# Patient Record
Sex: FEMALE | Race: WHITE | NOT HISPANIC OR LATINO | Employment: FULL TIME | URBAN - METROPOLITAN AREA
[De-identification: names, ages, dates, MRNs, and addresses within clinical notes are randomized per-mention and may not be internally consistent; named-entity substitution may affect disease eponyms.]

---

## 2018-05-21 ENCOUNTER — OFFICE VISIT (OUTPATIENT)
Dept: URGENT CARE | Age: 63
End: 2018-05-21
Payer: COMMERCIAL

## 2018-05-21 VITALS
BODY MASS INDEX: 26.2 KG/M2 | DIASTOLIC BLOOD PRESSURE: 77 MMHG | SYSTOLIC BLOOD PRESSURE: 166 MMHG | WEIGHT: 153.44 LBS | TEMPERATURE: 98.4 F | RESPIRATION RATE: 16 BRPM | HEART RATE: 83 BPM | HEIGHT: 64 IN | OXYGEN SATURATION: 99 %

## 2018-05-21 DIAGNOSIS — H57.12 EYE PAIN, LEFT: Primary | ICD-10-CM

## 2018-05-21 PROCEDURE — 99203 OFFICE O/P NEW LOW 30 MIN: CPT | Performed by: PHYSICIAN ASSISTANT

## 2018-05-21 RX ORDER — POLYMYXIN B SULFATE AND TRIMETHOPRIM 1; 10000 MG/ML; [USP'U]/ML
1 SOLUTION OPHTHALMIC EVERY 4 HOURS
Qty: 10 ML | Refills: 0 | Status: SHIPPED | OUTPATIENT
Start: 2018-05-21 | End: 2018-06-28 | Stop reason: ALTCHOICE

## 2018-05-21 RX ORDER — VALSARTAN 160 MG/1
TABLET ORAL
COMMUNITY
End: 2018-06-28

## 2018-05-21 RX ORDER — PROPARACAINE HYDROCHLORIDE 5 MG/ML
1 SOLUTION/ DROPS OPHTHALMIC ONCE
Status: COMPLETED | OUTPATIENT
Start: 2018-05-21 | End: 2018-05-21

## 2018-05-21 RX ORDER — TRAMADOL HYDROCHLORIDE 50 MG/1
1 TABLET ORAL
COMMUNITY
Start: 2015-08-30 | End: 2018-08-16 | Stop reason: SDUPTHER

## 2018-05-21 RX ADMIN — PROPARACAINE HYDROCHLORIDE 1 DROP: 5 SOLUTION/ DROPS OPHTHALMIC at 09:01

## 2018-05-21 NOTE — LETTER
May 21, 2018     Patient: Remington Garcia   YOB: 1955   Date of Visit: 5/21/2018       To Whom it May Concern:    Faraz Gallardo was seen in my clinic on 5/21/2018  She may return to work tomorrow  If you have any questions or concerns, please don't hesitate to call  Sincerely,          Hansa Donald PA-C        CC: No Recipients

## 2018-05-21 NOTE — PATIENT INSTRUCTIONS
Motrin and/or Tylenol for pain  Follow up with PCP in 3-5 days  Arthur Grater not wear contacts for the next week  Proceed to  ER if symptoms worsen  Eye Pain   WHAT YOU NEED TO KNOW:   Eye pain may be caused by a problem within your eye  A problem or condition in another body area can also cause pain that travels to your eye  Some causes of eye pain include dry eyes, inflammation, a sinus infection, or a cluster headache  DISCHARGE INSTRUCTIONS:   Medicines: You may need any of the following:  · Artificial tears  are eyedrops that can help moisturize your eyes and relieve your pain  Ask your healthcare provider how often to use artificial tears  · NSAIDs , such as ibuprofen, help decrease swelling, pain, and fever  This medicine is available with or without a doctor's order  NSAIDs can cause stomach bleeding or kidney problems in certain people  If you take blood thinner medicine, always ask if NSAIDs are safe for you  Always read the medicine label and follow directions  Do not give these medicines to children under 10months of age without direction from your child's healthcare provider  · Take your medicine as directed  Contact your healthcare provider if you think your medicine is not helping or if you have side effects  Tell him of her if you are allergic to any medicine  Keep a list of the medicines, vitamins, and herbs you take  Include the amounts, and when and why you take them  Bring the list or the pill bottles to follow-up visits  Carry your medicine list with you in case of an emergency  Follow up with your healthcare provider as directed: You may be referred to an eye specialist  Write down your questions so you remember to ask them during your visits  Contact your healthcare provider if:   · You have a fever  · Your eye pain gets worse when you move your eyes  · You have questions or concerns about your condition or care    Return to the emergency department if:   · You have any vision loss  · You have sudden vision changes such as blurred vision, double vision, or seeing halos around lights  · You develop severe eye pain  © 2017 [employer] Information is for End User's use only and may not be sold, redistributed or otherwise used for commercial purposes  All illustrations and images included in CareNotes® are the copyrighted property of [employer]  or [employer]  The above information is an  only  It is not intended as medical advice for individual conditions or treatments  Talk to your doctor, nurse or pharmacist before following any medical regimen to see if it is safe and effective for you  Eye Foreign Body   AMBULATORY CARE:   An eye foreign body (EFB)  is an object that gets stuck in your eye  Tiny pieces of metal, dust, wood, and sand are the most common foreign bodies  Signs and symptoms:   · The feeling that something is in your eye    · Eye pain, redness, or watering    · Sensitivity to light    · Blurry vision or changes in your vision  Seek care immediately if:   · You suddenly lose your vision  · You have severe eye pain  Contact your healthcare provider or ophthalmologist if:   · You have new or worse eye swelling  · Your symptoms do not get better, even after the foreign body is removed  · You have white or yellow fluid draining from your eye  · You have questions or concerns about your condition or care  Treatment for an EFB  will include medicine to decrease pain and prevent an infection  Your healthcare provider may numb your eye and flush it with liquid to help remove the FB  The provider may also use a cotton swab or other tools to help remove the FB  If the FB is hard to remove or has damaged deeper parts of your eye, you will need surgery to remove it  Help your eye heal:  You may have pain, sensitivity to light, or blurry vision for a few days   Do the following to help your eye heal:  · Do not rub your eye  This may cause more damage or infection  · Do not wear your contacts lenses until your eye heals  Ask your healthcare provider how long to follow this direction  · Wear sunglasses as directed  Sunglasses help protect the eye and decrease sensitivity to light  Prevent another EFB:   · Wear safety glasses, eye shields, or goggles  These items can prevent eye injury  Make sure the eyewear wraps around the sides of your face  Wear these items while you work with chemicals, metal, wood, or bodily fluids such as blood  Also wear protective eyewear during sports such as racquetball or swimming  Do not use regular eye glasses for eye protection  They will not protect your eyes from foreign bodies or chemicals  · Use contact lenses as directed  Wash your hands before you clean, insert, or remove your contacts  Insert and remove contact lenses correctly  Clean and change your contacts as directed to help prevent eye damage or infection  Follow up with your healthcare provider or ophthalmologist in 1 to 2 days:  Write down your questions so you remember to ask them during your visits  © 2017 [employer] [facility] Information is for End User's use only and may not be sold, redistributed or otherwise used for commercial purposes  All illustrations and images included in CareNotes® are the copyrighted property of [employer]  or [employer]  The above information is an  only  It is not intended as medical advice for individual conditions or treatments  Talk to your doctor, nurse or pharmacist before following any medical regimen to see if it is safe and effective for you

## 2018-05-21 NOTE — PROGRESS NOTES
[facility]        NAME: Katie Taylor is a 60 y o  female  : 1955    MRN: [id number]  DATE: May 21, 2018  TIME: 9:40 AM    Assessment and Plan   Eye pain, left [H57 12]  1  Eye pain, left  proparacaine (ALCAINE) 0 5 % ophthalmic solution 1 drop    fluorescein sodium sterile 1 mg ophthalmic strip 1 strip    polymyxin b-trimethoprim (POLYTRIM) ophthalmic solution         Patient Instructions     Motrin and/or Tylenol for pain  Follow up with PCP in 3-5 days  Alejandro Philip not wear contacts for the next week  Proceed to  ER if symptoms worsen  Chief Complaint     Chief Complaint   Patient presents with    Foreign Body in Eye     Pt states she got something in her left eye this morning while in the shower  History of Present Illness       Left Eye foreign body complaint that started this morning while in the shower  Wears contacts  No discharge  No blurry vision or double visiion  Eye Pain    The left eye is affected  This is a new problem  The current episode started today  The problem has been unchanged  There was no injury mechanism  The pain is mild  There is no known exposure to pink eye  She wears contacts (hard )  Pertinent negatives include no blurred vision, eye discharge, double vision, eye redness, fever, foreign body sensation, itching, nausea, photophobia, recent URI or vomiting  She has tried water for the symptoms  The treatment provided mild relief  Review of Systems   Review of Systems   Constitutional: Negative  Negative for fever  HENT: Negative  Eyes: Positive for pain  Negative for blurred vision, double vision, photophobia, discharge, redness, itching and visual disturbance  Respiratory: Negative  Cardiovascular: Negative  Gastrointestinal: Negative for nausea and vomiting  Musculoskeletal: Negative  Skin: Negative            Current Medications       Current Outpatient Prescriptions:     traMADol (ULTRAM) 50 mg tablet, Take 1 tablet by mouth, Disp: , Rfl:     valsartan (DIOVAN) 160 mg tablet, Take by mouth, Disp: , Rfl:     polymyxin b-trimethoprim (POLYTRIM) ophthalmic solution, Administer 1 drop into the left eye every 4 (four) hours, Disp: 10 mL, Rfl: 0  No current facility-administered medications for this visit  Current Allergies     Allergies as of 05/21/2018    (No Known Allergies)            The following portions of the patient's history were reviewed and updated as appropriate: allergies, current medications, past family history, past medical history, past social history, past surgical history and problem list      Past Medical History:   Diagnosis Date    Hypertension        Past Surgical History:   Procedure Laterality Date    TONSILLECTOMY      WISDOM TOOTH EXTRACTION         History reviewed  No pertinent family history  Medications have been verified  Objective   /77   Pulse 83   Temp 98 4 °F (36 9 °C) (Tympanic)   Resp 16   Ht 5' 4" (1 626 m)   Wt 69 6 kg (153 lb 7 oz)   SpO2 99%   BMI 26 34 kg/m²          Physical Exam     Physical Exam   Constitutional: She is oriented to person, place, and time  She appears well-developed and well-nourished  No distress  HENT:   Head: Normocephalic and atraumatic  Right Ear: External ear normal    Left Ear: External ear normal    Nose: Nose normal    Mouth/Throat: Oropharynx is clear and moist  No oropharyngeal exudate  Eyes: Conjunctivae, EOM and lids are normal  Pupils are equal, round, and reactive to light  Lids are everted and swept, no foreign bodies found  Right eye exhibits no chemosis, no discharge, no exudate and no hordeolum  No foreign body present in the right eye  Left eye exhibits no chemosis, no discharge, no exudate and no hordeolum  No foreign body present in the left eye  Right conjunctiva is not injected  Right conjunctiva has no hemorrhage  Left conjunctiva is not injected  Left conjunctiva has no hemorrhage  No scleral icterus     2 drops of proparacaine instilled to L eye and fluorescein applied  No foreign bodies or abrasions noted to the cornea  Cornea appears to irritated from rubbing  Neck: Normal range of motion  Neck supple  Cardiovascular: Normal rate, regular rhythm, normal heart sounds and intact distal pulses  No murmur heard  Pulmonary/Chest: Effort normal and breath sounds normal  No respiratory distress  She has no rales  Abdominal: Soft  Bowel sounds are normal  There is no tenderness  Musculoskeletal: Normal range of motion  Lymphadenopathy:     She has no cervical adenopathy  Neurological: She is alert and oriented to person, place, and time  Skin: Skin is warm and dry  Psychiatric: She has a normal mood and affect  Nursing note and vitals reviewed        Procedures

## 2018-06-26 ENCOUNTER — APPOINTMENT (OUTPATIENT)
Dept: LAB | Facility: IMAGING CENTER | Age: 63
End: 2018-06-26
Payer: COMMERCIAL

## 2018-06-26 ENCOUNTER — TRANSCRIBE ORDERS (OUTPATIENT)
Dept: ADMINISTRATIVE | Facility: HOSPITAL | Age: 63
End: 2018-06-26

## 2018-06-26 DIAGNOSIS — E78.49 OTHER HYPERLIPIDEMIA: ICD-10-CM

## 2018-06-26 DIAGNOSIS — E78.49 OTHER HYPERLIPIDEMIA: Primary | ICD-10-CM

## 2018-06-26 DIAGNOSIS — I10 ESSENTIAL (PRIMARY) HYPERTENSION: ICD-10-CM

## 2018-06-26 LAB
ALBUMIN SERPL BCP-MCNC: 3.8 G/DL (ref 3.5–5)
ALP SERPL-CCNC: 82 U/L (ref 46–116)
ALT SERPL W P-5'-P-CCNC: 25 U/L (ref 12–78)
ANION GAP SERPL CALCULATED.3IONS-SCNC: 6 MMOL/L (ref 4–13)
AST SERPL W P-5'-P-CCNC: 27 U/L (ref 5–45)
BILIRUB SERPL-MCNC: 0.48 MG/DL (ref 0.2–1)
BUN SERPL-MCNC: 23 MG/DL (ref 5–25)
CALCIUM SERPL-MCNC: 9.3 MG/DL (ref 8.3–10.1)
CHLORIDE SERPL-SCNC: 105 MMOL/L (ref 100–108)
CHOLEST SERPL-MCNC: 226 MG/DL (ref 50–200)
CO2 SERPL-SCNC: 31 MMOL/L (ref 21–32)
CREAT SERPL-MCNC: 0.69 MG/DL (ref 0.6–1.3)
GFR SERPL CREATININE-BSD FRML MDRD: 93 ML/MIN/1.73SQ M
GLUCOSE P FAST SERPL-MCNC: 89 MG/DL (ref 65–99)
HDLC SERPL-MCNC: 83 MG/DL (ref 40–60)
LDLC SERPL CALC-MCNC: 130 MG/DL (ref 0–100)
POTASSIUM SERPL-SCNC: 4.7 MMOL/L (ref 3.5–5.3)
PROT SERPL-MCNC: 6.6 G/DL (ref 6.4–8.2)
SODIUM SERPL-SCNC: 142 MMOL/L (ref 136–145)
TRIGL SERPL-MCNC: 67 MG/DL

## 2018-06-26 PROCEDURE — 80053 COMPREHEN METABOLIC PANEL: CPT

## 2018-06-26 PROCEDURE — 80061 LIPID PANEL: CPT

## 2018-06-26 PROCEDURE — 36415 COLL VENOUS BLD VENIPUNCTURE: CPT

## 2018-06-27 RX ORDER — ROSUVASTATIN CALCIUM 5 MG/1
TABLET, COATED ORAL EVERY 24 HOURS
COMMUNITY
Start: 2018-03-29 | End: 2020-04-13 | Stop reason: ALTCHOICE

## 2018-06-28 ENCOUNTER — OFFICE VISIT (OUTPATIENT)
Dept: FAMILY MEDICINE CLINIC | Facility: CLINIC | Age: 63
End: 2018-06-28
Payer: COMMERCIAL

## 2018-06-28 VITALS
OXYGEN SATURATION: 97 % | HEIGHT: 64 IN | DIASTOLIC BLOOD PRESSURE: 76 MMHG | HEART RATE: 89 BPM | TEMPERATURE: 97.9 F | SYSTOLIC BLOOD PRESSURE: 156 MMHG | RESPIRATION RATE: 16 BRPM

## 2018-06-28 DIAGNOSIS — G89.29 CHRONIC LOW BACK PAIN WITHOUT SCIATICA, UNSPECIFIED BACK PAIN LATERALITY: ICD-10-CM

## 2018-06-28 DIAGNOSIS — F41.9 ANXIETY: ICD-10-CM

## 2018-06-28 DIAGNOSIS — M19.042 PRIMARY OSTEOARTHRITIS OF BOTH HANDS: ICD-10-CM

## 2018-06-28 DIAGNOSIS — E55.9 VITAMIN D INSUFFICIENCY: ICD-10-CM

## 2018-06-28 DIAGNOSIS — E78.2 MIXED HYPERLIPIDEMIA: ICD-10-CM

## 2018-06-28 DIAGNOSIS — I10 ESSENTIAL HYPERTENSION: Primary | ICD-10-CM

## 2018-06-28 DIAGNOSIS — M54.50 CHRONIC LOW BACK PAIN WITHOUT SCIATICA, UNSPECIFIED BACK PAIN LATERALITY: ICD-10-CM

## 2018-06-28 DIAGNOSIS — M19.041 PRIMARY OSTEOARTHRITIS OF BOTH HANDS: ICD-10-CM

## 2018-06-28 PROCEDURE — 99214 OFFICE O/P EST MOD 30 MIN: CPT | Performed by: FAMILY MEDICINE

## 2018-06-28 RX ORDER — VALSARTAN AND HYDROCHLOROTHIAZIDE 160; 12.5 MG/1; MG/1
1 TABLET, FILM COATED ORAL DAILY
Qty: 30 TABLET | Refills: 3 | Status: SHIPPED | OUTPATIENT
Start: 2018-06-28 | End: 2018-08-16

## 2018-06-28 NOTE — PROGRESS NOTES
Assessment/Plan:    No problem-specific Assessment & Plan notes found for this encounter  The patient was told to hold off her Crestor for 1 more week  I am going to adjust her Diovan and add hydrochlorothiazide  She is going to continue to monitor her blood pressure at home  She is going to take Tylenol for her mild to moderate pain  Tramadol for severe pain  She is also going to try to take Aleve for her arthritis pain  Diagnoses and all orders for this visit:    Essential hypertension  -     valsartan-hydrochlorothiazide (DIOVAN-HCT) 160-12 5 MG per tablet; Take 1 tablet by mouth daily  -     CBC and differential; Future  -     Comprehensive metabolic panel; Future  -     TSH, 3rd generation with T4 reflex; Future    Mixed hyperlipidemia  -     Lipid Panel with Direct LDL reflex; Future    Anxiety    Chronic low back pain without sciatica, unspecified back pain laterality    Primary osteoarthritis of both hands    Vitamin D insufficiency  -     Vitamin D 25 hydroxy; Future    Other orders  -     rosuvastatin (CRESTOR) 5 mg tablet; every 24 hours          Subjective:      Patient ID: Maru Kenny is a 60 y o  female  Hyperlipidemia   Pertinent negatives include no chest pain or shortness of breath  Hypertension   Associated symptoms include headaches  Pertinent negatives include no chest pain, palpitations or shortness of breath  Patient is here today for follow-up of above medical problems  She has been experiencing headache  She held on her Crestor  She continued to have some headache  She has been monitoring her blood pressure at home it has been elevated  She also complained of pain and ache in her hands worse in her right hand  She has history of arthritis  The following portions of the patient's history were reviewed and updated as appropriate:   She  has a past medical history of Anxiety (2/24/2012); Asthma; Hyperlipidemia;  Hypertension; Osteoarthritis (6/5/2007); and Skin cancer (07/16/2015)  She   Patient Active Problem List    Diagnosis Date Noted    Vitamin D insufficiency 06/28/2018    Urinary incontinence 08/20/2015    Anxiety 02/24/2012    Hyperlipidemia 01/19/2011    Herpes simplex 06/21/2008    Osteoarthritis 06/05/2007    Spinal stenosis 12/22/2006    Herniation of intervertebral disc 10/07/2002    Back pain 10/07/2002    Hypertension 04/04/2002    Allergic rhinitis 06/19/2000     She  has a past surgical history that includes [place] tooth extraction and Tonsillectomy  Her family history includes Hypertension in her brother, father, mother, and sister  Current Outpatient Prescriptions on File Prior to Visit   Medication Sig    traMADol (ULTRAM) 50 mg tablet Take 1 tablet by mouth    [DISCONTINUED] valsartan (DIOVAN) 160 mg tablet Take by mouth    [DISCONTINUED] polymyxin b-trimethoprim (POLYTRIM) ophthalmic solution Administer 1 drop into the left eye every 4 (four) hours     No current facility-administered medications on file prior to visit  She has No Known Allergies       Review of Systems   Constitutional: Negative for chills and fever  HENT: Negative for trouble swallowing  Eyes: Negative for visual disturbance  Respiratory: Negative for cough and shortness of breath  Cardiovascular: Negative for chest pain, palpitations and leg swelling  Gastrointestinal: Negative for abdominal pain, constipation and diarrhea  Endocrine: Negative for cold intolerance and heat intolerance  Genitourinary: Negative for difficulty urinating and dysuria  Musculoskeletal: Positive for arthralgias  Negative for gait problem  Skin: Negative for rash  Neurological: Positive for headaches  Negative for dizziness, tremors and seizures  Hematological: Negative for adenopathy  Psychiatric/Behavioral: Negative for behavioral problems           Objective:      /76 (BP Location: Left arm, Patient Position: Sitting, Cuff Size: Adult)   Pulse 89 Temp 97 9 °F (36 6 °C) (Tympanic)   Resp 16   Ht 5' 4" (1 626 m)   LMP  (Exact Date)   SpO2 97%          Physical Exam   Constitutional: She is oriented to person, place, and time  She appears well-developed and well-nourished  HENT:   Head: Normocephalic and atraumatic  Eyes: EOM are normal  Pupils are equal, round, and reactive to light  Neck: Normal range of motion  Neck supple  Cardiovascular: Normal rate, regular rhythm and normal heart sounds  Pulmonary/Chest: Effort normal and breath sounds normal    Abdominal: Soft  Bowel sounds are normal    Musculoskeletal: Normal range of motion  She exhibits no edema  Lymphadenopathy:     She has no cervical adenopathy  Neurological: She is alert and oriented to person, place, and time  No cranial nerve deficit  Skin: Skin is warm  Psychiatric: She has a normal mood and affect

## 2018-07-17 ENCOUNTER — TELEPHONE (OUTPATIENT)
Dept: FAMILY MEDICINE CLINIC | Facility: CLINIC | Age: 63
End: 2018-07-17

## 2018-07-17 NOTE — TELEPHONE ENCOUNTER
Pc from pt states she got a bill from [facility] 6/28/18 states she was charged for Behavior chain smoking & she does not smoke & Impacted ear wax pt states she was here on that date to review her labs,she did not have wax removed from her ears  Also she was charged for a Preventative visit   Pts #[phone]

## 2018-07-18 NOTE — TELEPHONE ENCOUNTER
I spoke with IT, doctors office note does not state she is a smoker to list ear was removal  They states it was pulled in from another source  They sent it to analysis dept to call me back  Gaye Garcia called me back , states states pt was only charged for [id number] (25 min ov) she does have balance of $21 36   She checked both physician and hospital billing, she did not see those charged  She gave me a phone number for pt to call [phone]  Discussed in detailed with pt and she agrees to call the given phone number

## 2018-08-16 ENCOUNTER — OFFICE VISIT (OUTPATIENT)
Dept: FAMILY MEDICINE CLINIC | Facility: CLINIC | Age: 63
End: 2018-08-16
Payer: COMMERCIAL

## 2018-08-16 VITALS
RESPIRATION RATE: 16 BRPM | HEIGHT: 64 IN | SYSTOLIC BLOOD PRESSURE: 166 MMHG | WEIGHT: 140 LBS | BODY MASS INDEX: 23.9 KG/M2 | TEMPERATURE: 97.9 F | OXYGEN SATURATION: 96 % | DIASTOLIC BLOOD PRESSURE: 78 MMHG | HEART RATE: 70 BPM

## 2018-08-16 DIAGNOSIS — M54.9 CHRONIC BACK PAIN, UNSPECIFIED BACK LOCATION, UNSPECIFIED BACK PAIN LATERALITY: ICD-10-CM

## 2018-08-16 DIAGNOSIS — I10 ESSENTIAL HYPERTENSION: Primary | ICD-10-CM

## 2018-08-16 DIAGNOSIS — G89.29 CHRONIC BACK PAIN, UNSPECIFIED BACK LOCATION, UNSPECIFIED BACK PAIN LATERALITY: ICD-10-CM

## 2018-08-16 PROCEDURE — 3008F BODY MASS INDEX DOCD: CPT | Performed by: FAMILY MEDICINE

## 2018-08-16 PROCEDURE — 99213 OFFICE O/P EST LOW 20 MIN: CPT | Performed by: FAMILY MEDICINE

## 2018-08-16 PROCEDURE — 1036F TOBACCO NON-USER: CPT | Performed by: FAMILY MEDICINE

## 2018-08-16 RX ORDER — LOSARTAN POTASSIUM 100 MG/1
100 TABLET ORAL DAILY
Qty: 30 TABLET | Refills: 0 | Status: SHIPPED | OUTPATIENT
Start: 2018-08-16 | End: 2020-06-21

## 2018-08-16 RX ORDER — TRAMADOL HYDROCHLORIDE 50 MG/1
50 TABLET ORAL EVERY 8 HOURS PRN
Qty: 30 TABLET | Refills: 0 | Status: SHIPPED | OUTPATIENT
Start: 2018-08-16 | End: 2020-04-13 | Stop reason: ALTCHOICE

## 2018-08-16 RX ORDER — LOSARTAN POTASSIUM 100 MG/1
100 TABLET ORAL DAILY
Qty: 30 TABLET | Refills: 3 | Status: SHIPPED | OUTPATIENT
Start: 2018-08-16 | End: 2018-08-16 | Stop reason: SDUPTHER

## 2018-08-16 RX ORDER — TRAMADOL HYDROCHLORIDE 50 MG/1
50 TABLET ORAL EVERY 8 HOURS PRN
Qty: 30 TABLET | Refills: 3 | Status: SHIPPED | OUTPATIENT
Start: 2018-08-16 | End: 2018-08-16 | Stop reason: SDUPTHER

## 2018-08-16 NOTE — PROGRESS NOTES
Assessment/Plan:     Diagnoses and all orders for this visit:    Essential hypertension  Comments:  Not well controlled, I am going to switch her to losartan 100 mg  She was told to monitor her blood pressure and come back in 1 months  Orders:  -     Discontinue: losartan (COZAAR) 100 MG tablet; Take 1 tablet (100 mg total) by mouth daily  -     losartan (COZAAR) 100 MG tablet; Take 1 tablet (100 mg total) by mouth daily    Chronic back pain, unspecified back location, unspecified back pain laterality  Comments:  Stable, continue same she was given refills for tramadol  Orders:  -     Discontinue: traMADol (ULTRAM) 50 mg tablet; Take 1 tablet (50 mg total) by mouth every 8 (eight) hours as needed for moderate pain  -     traMADol (ULTRAM) 50 mg tablet; Take 1 tablet (50 mg total) by mouth every 8 (eight) hours as needed for moderate pain          There are no Patient Instructions on file for this visit  Return in about 1 month (around 9/16/2018)  Subjective:      Patient ID: Reza Villatoro is a 58 y o  female  Chief Complaint   Patient presents with    Hypertension       She is here today was multiple complaints about billing mistakes from the last visit  The patient was assured that we will work with the billing people to try to fix the problem  She is also here for follow-up hypertension and chronic low back pain  Hypertension   Pertinent negatives include no chest pain, headaches, palpitations or shortness of breath  The following portions of the patient's history were reviewed and updated as appropriate: allergies, current medications, past family history, past medical history, past social history, past surgical history and problem list     Review of Systems   Constitutional: Negative for chills and fever  HENT: Negative for trouble swallowing  Eyes: Negative for visual disturbance  Respiratory: Negative for cough and shortness of breath      Cardiovascular: Negative for chest pain, palpitations and leg swelling  Gastrointestinal: Negative for abdominal pain, constipation and diarrhea  Endocrine: Negative for cold intolerance and heat intolerance  Genitourinary: Negative for difficulty urinating and dysuria  Musculoskeletal: Positive for back pain  Negative for gait problem  Skin: Negative for rash  Neurological: Negative for dizziness, tremors, seizures and headaches  Hematological: Negative for adenopathy  Psychiatric/Behavioral: Negative for behavioral problems  The patient is nervous/anxious  Current Outpatient Prescriptions   Medication Sig Dispense Refill    losartan (COZAAR) 100 MG tablet Take 1 tablet (100 mg total) by mouth daily 30 tablet 0    traMADol (ULTRAM) 50 mg tablet Take 1 tablet (50 mg total) by mouth every 8 (eight) hours as needed for moderate pain 30 tablet 0    rosuvastatin (CRESTOR) 5 mg tablet every 24 hours       No current facility-administered medications for this visit  Objective:    /78 (BP Location: Left arm, Patient Position: Sitting, Cuff Size: Adult)   Pulse 70   Temp 97 9 °F (36 6 °C) (Tympanic)   Resp 16   Ht 5' 4" (1 626 m)   Wt 63 5 kg (140 lb)   SpO2 96%   BMI 24 03 kg/m²        Physical Exam   Constitutional: She is oriented to person, place, and time  She appears well-developed and well-nourished  HENT:   Head: Normocephalic and atraumatic  Eyes: EOM are normal  Pupils are equal, round, and reactive to light  Neck: Normal range of motion  Neck supple  Cardiovascular: Normal rate, regular rhythm and normal heart sounds  Pulmonary/Chest: Effort normal and breath sounds normal    Abdominal: Soft  Bowel sounds are normal    Musculoskeletal: Normal range of motion  She exhibits no edema  Lymphadenopathy:     She has no cervical adenopathy  Neurological: She is alert and oriented to person, place, and time  No cranial nerve deficit  Skin: Skin is warm     Psychiatric: She has a normal mood and affect  Nursing note and vitals reviewed               Jaylen Quijano MD

## 2019-04-07 DIAGNOSIS — I10 ESSENTIAL HYPERTENSION: ICD-10-CM

## 2019-04-07 RX ORDER — LOSARTAN POTASSIUM 100 MG/1
TABLET ORAL
Qty: 30 TABLET | Refills: 3 | Status: SHIPPED | OUTPATIENT
Start: 2019-04-07 | End: 2019-11-29 | Stop reason: SDUPTHER

## 2019-05-01 ENCOUNTER — TELEPHONE (OUTPATIENT)
Dept: FAMILY MEDICINE CLINIC | Facility: CLINIC | Age: 64
End: 2019-05-01

## 2019-05-20 ENCOUNTER — OFFICE VISIT (OUTPATIENT)
Dept: URGENT CARE | Age: 64
End: 2019-05-20
Payer: COMMERCIAL

## 2019-05-20 VITALS
HEART RATE: 81 BPM | DIASTOLIC BLOOD PRESSURE: 80 MMHG | RESPIRATION RATE: 16 BRPM | SYSTOLIC BLOOD PRESSURE: 150 MMHG | BODY MASS INDEX: 25.61 KG/M2 | WEIGHT: 150 LBS | HEIGHT: 64 IN | TEMPERATURE: 97.6 F | OXYGEN SATURATION: 98 %

## 2019-05-20 DIAGNOSIS — J01.00 ACUTE MAXILLARY SINUSITIS, RECURRENCE NOT SPECIFIED: Primary | ICD-10-CM

## 2019-05-20 PROCEDURE — 99213 OFFICE O/P EST LOW 20 MIN: CPT | Performed by: PHYSICIAN ASSISTANT

## 2019-05-20 RX ORDER — AZITHROMYCIN 250 MG/1
TABLET, FILM COATED ORAL
Qty: 6 TABLET | Refills: 0 | Status: SHIPPED | OUTPATIENT
Start: 2019-05-20 | End: 2019-05-24

## 2019-05-20 RX ORDER — FLUTICASONE PROPIONATE 50 MCG
1 SPRAY, SUSPENSION (ML) NASAL DAILY
Qty: 16 G | Refills: 0 | Status: SHIPPED | OUTPATIENT
Start: 2019-05-20 | End: 2020-04-13 | Stop reason: SDUPTHER

## 2019-07-11 ENCOUNTER — TELEPHONE (OUTPATIENT)
Dept: FAMILY MEDICINE CLINIC | Facility: CLINIC | Age: 64
End: 2019-07-11

## 2019-07-11 DIAGNOSIS — Z12.31 SCREENING MAMMOGRAM, ENCOUNTER FOR: Primary | ICD-10-CM

## 2019-07-11 NOTE — TELEPHONE ENCOUNTER
Lm for pt to call and schedule mammogram with central scheduling number, and if she already had it done to inform us so we can get records

## 2019-07-19 ENCOUNTER — APPOINTMENT (OUTPATIENT)
Dept: RADIOLOGY | Age: 64
End: 2019-07-19
Payer: COMMERCIAL

## 2019-07-19 ENCOUNTER — OFFICE VISIT (OUTPATIENT)
Dept: URGENT CARE | Age: 64
End: 2019-07-19
Payer: COMMERCIAL

## 2019-07-19 VITALS
HEART RATE: 76 BPM | WEIGHT: 145 LBS | OXYGEN SATURATION: 98 % | BODY MASS INDEX: 24.75 KG/M2 | TEMPERATURE: 99.4 F | DIASTOLIC BLOOD PRESSURE: 79 MMHG | RESPIRATION RATE: 16 BRPM | HEIGHT: 64 IN | SYSTOLIC BLOOD PRESSURE: 169 MMHG

## 2019-07-19 DIAGNOSIS — M25.572 ACUTE LEFT ANKLE PAIN: ICD-10-CM

## 2019-07-19 DIAGNOSIS — S93.402A SPRAIN OF LEFT ANKLE, UNSPECIFIED LIGAMENT, INITIAL ENCOUNTER: Primary | ICD-10-CM

## 2019-07-19 PROCEDURE — 73630 X-RAY EXAM OF FOOT: CPT

## 2019-07-19 PROCEDURE — 99213 OFFICE O/P EST LOW 20 MIN: CPT | Performed by: PHYSICIAN ASSISTANT

## 2019-07-19 PROCEDURE — 73610 X-RAY EXAM OF ANKLE: CPT

## 2019-07-19 NOTE — PROGRESS NOTES
[facility]        NAME: Katie Taylor is a 71 y o  female  : 1955    MRN: [id number]  DATE: 2019  TIME: 12:49 PM    Assessment and Plan   Sprain of left ankle, unspecified ligament, initial encounter [S93 402A]  1  Sprain of left ankle, unspecified ligament, initial encounter  XR foot 3+ vw left    XR ankle 3+ vw left     X-ray provider read, no acute findings  Patient placed in air cast   Patient given information for Orthopedics  Patient declines crutches at this time    Patient Instructions       Rest, ice, elevate the affected limb  Take over-the-counter pain medication for symptom relief  Follow up with Orthopedics this week  Call Barbra Juárez to schedule an appointment: [phone]  Go to ER if new or worsening symptoms occur  Chief Complaint     Chief Complaint   Patient presents with    Ankle Pain     Pt c/o left ankle pain since today  Pt was walking and steped into a divot in the grass  Pt states she rolled her left ankle  History of Present Illness       3 hours pta, patient was walking in her yd when she stepped into a divot on the ground  Patient states that she inverted her left ankle  Patient states that she did not fall  Patient has been weight-bearing on ankle since the incident but with pain  Patient rates her pain as 7/10  Patient states pain is worse with ambulation and inversion eversion of the ankle  Pain is better with ice and she has also taken 600 mg of ibuprofen  Patient has no prior injury to his ankle but she has a prior injury to 5th metatarsal of left foot  No numbness or tingling distally  Review of Systems   Review of Systems   Constitutional: Negative  Negative for chills, fatigue and fever  HENT: Negative  Eyes: Negative  Respiratory: Negative  Negative for chest tightness, shortness of breath and wheezing  Cardiovascular: Negative  Negative for chest pain and palpitations     Gastrointestinal: Negative for abdominal pain, constipation, diarrhea, nausea and vomiting  Endocrine: Negative  Genitourinary: Negative for dysuria, flank pain, frequency, pelvic pain, vaginal discharge and vaginal pain  Musculoskeletal: Positive for gait problem and joint swelling  Negative for back pain, neck pain and neck stiffness  Skin: Negative  Negative for pallor and rash  Allergic/Immunologic: Negative  Neurological: Negative for weakness and numbness  Hematological: Negative  Psychiatric/Behavioral: Negative            Current Medications       Current Outpatient Medications:     losartan (COZAAR) 100 MG tablet, Take 1 tablet (100 mg total) by mouth daily, Disp: 30 tablet, Rfl: 0    losartan (COZAAR) 100 MG tablet, TAKE ONE TABLET BY MOUTH EVERY DAY, Disp: 30 tablet, Rfl: 3    fluticasone (FLONASE) 50 mcg/act nasal spray, 1 spray into each nostril daily (Patient not taking: Reported on 7/19/2019), Disp: 16 g, Rfl: 0    loratadine (CLARITIN REDITABS) 10 MG dissolvable tablet, Take 1 tablet (10 mg total) by mouth daily for 14 days, Disp: 14 tablet, Rfl: 0    rosuvastatin (CRESTOR) 5 mg tablet, every 24 hours, Disp: , Rfl:     traMADol (ULTRAM) 50 mg tablet, Take 1 tablet (50 mg total) by mouth every 8 (eight) hours as needed for moderate pain (Patient not taking: Reported on 5/20/2019), Disp: 30 tablet, Rfl: 0    Current Allergies     Allergies as of 07/19/2019 - Reviewed 07/19/2019   Allergen Reaction Noted    No active allergies  06/28/2018            The following portions of the patient's history were reviewed and updated as appropriate: allergies, current medications, past family history, past medical history, past social history, past surgical history and problem list      Past Medical History:   Diagnosis Date    Anxiety 2/24/2012    Asthma     Hyperlipidemia     Hypertension     Osteoarthritis 6/5/2007    Skin cancer 07/16/2015    right thigh       Past Surgical History:   Procedure Laterality Date    TONSILLECTOMY      WISDOM TOOTH EXTRACTION         Family History   Problem Relation Age of Onset    Hypertension Mother     Hypertension Father     Hypertension Sister     Hypertension Brother          Medications have been verified  Objective   /79   Pulse 76   Temp 99 4 °F (37 4 °C) (Tympanic)   Resp 16   Ht 5' 4" (1 626 m)   Wt 65 8 kg (145 lb)   SpO2 98%   BMI 24 89 kg/m²        Physical Exam     Physical Exam   Constitutional: She appears well-developed and well-nourished  No distress  HENT:   Head: Normocephalic and atraumatic  Cardiovascular: Normal rate, regular rhythm, normal heart sounds and intact distal pulses  Pulmonary/Chest: Effort normal and breath sounds normal  No respiratory distress  She has no wheezes  She has no rales  Musculoskeletal:        Left ankle: She exhibits swelling  She exhibits normal range of motion, no deformity, no laceration and normal pulse  Tenderness  Lateral malleolus and AITFL tenderness found  No medial malleolus and no proximal fibula tenderness found  Achilles tendon normal  Achilles tendon exhibits normal Peralta's test results  Left foot: There is normal range of motion, no tenderness, no swelling and no deformity  Skin: Skin is warm  No rash noted  She is not diaphoretic  Nursing note and vitals reviewed

## 2019-07-19 NOTE — PATIENT INSTRUCTIONS
Rest, ice, elevate the affected limb  Take over-the-counter pain medication for symptom relief  Follow up with Orthopedics this week  Call Peter Brambila to schedule an appointment: [phone]  Go to ER if new or worsening symptoms occur  Ankle Sprain   WHAT YOU NEED TO KNOW:   An ankle sprain happens when 1 or more ligaments in your ankle joint stretch or tear  Ligaments are tough tissues that connect bones  Ligaments support your joints and keep your bones in place  DISCHARGE INSTRUCTIONS:   Return to the emergency department if:   · You have severe pain in your ankle  · Your foot or toes are cold or numb  · Your ankle becomes more weak or unstable (wobbly)  · You are unable to put any weight on your ankle or foot  · Your swelling has increased or returned  Contact your healthcare provider if:   · Your pain does not go away, even after treatment  · You have questions or concerns about your condition or care  Medicines: You may need any of the following:  · NSAIDs , such as ibuprofen, help decrease swelling, pain, and fever  This medicine is available with or without a doctor's order  NSAIDs can cause stomach bleeding or kidney problems in certain people  If you take blood thinner medicine, always ask your healthcare provider if NSAIDs are safe for you  Always read the medicine label and follow directions  · Acetaminophen  decreases pain  It is available without a doctor's order  Ask how much to take and how often to take it  Follow directions  Acetaminophen can cause liver damage if not taken correctly  · Prescription pain medicine  may be given  Ask how to take this medicine safely  · Take your medicine as directed  Contact your healthcare provider if you think your medicine is not helping or if you have side effects  Tell him or her if you are allergic to any medicine  Keep a list of the medicines, vitamins, and herbs you take   Include the amounts, and when and why you take them  Bring the list or the pill bottles to follow-up visits  Carry your medicine list with you in case of an emergency  Self care:   · Use support devices,  such as a brace, cast, or splint, may be needed to limit your movement and protect your joint  You may need to use crutches to decrease your pain as you move around  · Go to physical therapy as directed  A physical therapist teaches you exercises to help improve movement and strength, and to decrease pain  · Rest  your ankle so that it can heal  Return to normal activities as directed  · Apply ice on your ankle for 15 to 20 minutes every hour or as directed  Use an ice pack, or put crushed ice in a plastic bag  Cover it with a towel  Ice helps prevent tissue damage and decreases swelling and pain  · Compress  your ankle  Ask if you should wrap an elastic bandage around your injured ligament  An elastic bandage provides support and helps decrease swelling and movement so your joint can heal  Wear as long as directed  · Elevate  your ankle above the level of your heart as often as you can  This will help decrease swelling and pain  Prop your ankle on pillows or blankets to keep it elevated comfortably  Prevent another ankle sprain:   · Let your ankle heal   Find out how long your ligament needs to heal  Do not do any physical activity until your healthcare provider says it is okay  If you start activity too soon, you may develop a more serious injury  · Always warm up and stretch  before you exercise or play sports  · Use the right equipment  Always wear shoes that fit well and are made for the activity that you are doing  You may also need ankle supports, elbow and knee pads, or braces  Follow up with your healthcare provider as directed:  Write down your questions so you remember to ask them during your visits     © 2017 [employer] Information is for End User's use only and may not be sold, redistributed or otherwise used for commercial purposes  All illustrations and images included in CareNotes® are the copyrighted property of A D [employer] M [employer] Inc  or [employer]  The above information is an  only  It is not intended as medical advice for individual conditions or treatments  Talk to your doctor, nurse or pharmacist before following any medical regimen to see if it is safe and effective for you

## 2019-07-22 ENCOUNTER — TELEPHONE (OUTPATIENT)
Dept: URGENT CARE | Age: 64
End: 2019-07-22

## 2019-07-22 DIAGNOSIS — S82.892A CLOSED FRACTURE OF LEFT ANKLE, INITIAL ENCOUNTER: Primary | ICD-10-CM

## 2019-07-22 NOTE — TELEPHONE ENCOUNTER
Spoke with patient about findings of foot and ankle xrays done on Friday  Pt is coming in today for a CD, and splint to be placed on the left ankle  Pt is upset with the amt of days that went by before this phone call  She does have an appt at 1pm with [facility] for evaluation due to increased pain over the weekend  She is upset that she wasn't called over the weekend  She agreed to come in today for the CD, splint and crutches and she requested information to whom to file a complaint to  Practice administration business card and referral for ortho will be given to pt today       ALBA Scott

## 2019-08-14 ENCOUNTER — APPOINTMENT (OUTPATIENT)
Dept: RADIOLOGY | Age: 64
End: 2019-08-14
Payer: COMMERCIAL

## 2019-08-14 ENCOUNTER — TRANSCRIBE ORDERS (OUTPATIENT)
Dept: RADIOLOGY | Age: 64
End: 2019-08-14

## 2019-08-14 DIAGNOSIS — R26.9 ABNORMALITY OF GAIT: Primary | ICD-10-CM

## 2019-08-14 DIAGNOSIS — R26.9 ABNORMALITY OF GAIT: ICD-10-CM

## 2019-08-14 PROCEDURE — 73610 X-RAY EXAM OF ANKLE: CPT

## 2019-08-21 ENCOUNTER — TELEPHONE (OUTPATIENT)
Dept: FAMILY MEDICINE CLINIC | Facility: CLINIC | Age: 64
End: 2019-08-21

## 2019-09-19 ENCOUNTER — TELEPHONE (OUTPATIENT)
Dept: FAMILY MEDICINE CLINIC | Facility: CLINIC | Age: 64
End: 2019-09-19

## 2019-09-19 NOTE — TELEPHONE ENCOUNTER
Lm that she needs to schedule an ov,mammogram and colon screening which we can order at [facility]

## 2019-11-29 DIAGNOSIS — I10 ESSENTIAL HYPERTENSION: ICD-10-CM

## 2019-11-30 RX ORDER — LOSARTAN POTASSIUM 100 MG/1
TABLET ORAL
Qty: 30 TABLET | Refills: 3 | Status: SHIPPED | OUTPATIENT
Start: 2019-11-30 | End: 2020-04-13 | Stop reason: SDUPTHER

## 2019-12-17 ENCOUNTER — TELEPHONE (OUTPATIENT)
Dept: FAMILY MEDICINE CLINIC | Facility: CLINIC | Age: 64
End: 2019-12-17

## 2019-12-17 ENCOUNTER — OFFICE VISIT (OUTPATIENT)
Dept: FAMILY MEDICINE CLINIC | Facility: CLINIC | Age: 64
End: 2019-12-17
Payer: COMMERCIAL

## 2019-12-17 VITALS
HEIGHT: 64 IN | BODY MASS INDEX: 24.43 KG/M2 | DIASTOLIC BLOOD PRESSURE: 88 MMHG | OXYGEN SATURATION: 98 % | SYSTOLIC BLOOD PRESSURE: 160 MMHG | WEIGHT: 143.13 LBS | HEART RATE: 94 BPM | TEMPERATURE: 97.9 F | RESPIRATION RATE: 16 BRPM

## 2019-12-17 DIAGNOSIS — Z12.11 ENCOUNTER FOR SCREENING FOR MALIGNANT NEOPLASM OF COLON: ICD-10-CM

## 2019-12-17 DIAGNOSIS — Z00.00 HEALTHCARE MAINTENANCE: Primary | ICD-10-CM

## 2019-12-17 DIAGNOSIS — E78.2 MIXED HYPERLIPIDEMIA: ICD-10-CM

## 2019-12-17 DIAGNOSIS — Z12.31 SCREENING MAMMOGRAM, ENCOUNTER FOR: ICD-10-CM

## 2019-12-17 DIAGNOSIS — G89.29 CHRONIC PAIN OF LEFT KNEE: ICD-10-CM

## 2019-12-17 DIAGNOSIS — I10 ESSENTIAL HYPERTENSION: ICD-10-CM

## 2019-12-17 DIAGNOSIS — M25.562 CHRONIC PAIN OF LEFT KNEE: ICD-10-CM

## 2019-12-17 PROCEDURE — 99397 PER PM REEVAL EST PAT 65+ YR: CPT | Performed by: FAMILY MEDICINE

## 2019-12-17 NOTE — ASSESSMENT & PLAN NOTE
Not well controlled in the office but well controlled at home  She is to continue same and monitor blood pressure and call me back in 1 week with measurements  If it is elevated I will consider increasing her losartan to 75 mg daily

## 2019-12-17 NOTE — ASSESSMENT & PLAN NOTE
Not well controlled  It was discussed about low-fat diet and regular exercise  I will check her fasting lipid profile  Patient does not desire to be on cholesterol medicine

## 2019-12-17 NOTE — TELEPHONE ENCOUNTER
Received fax form [employer] requesting prior auth for diclofenac gel  Prior auth started through cover my meds awaiting questions form [facility]

## 2019-12-17 NOTE — PROGRESS NOTES
Assessment/Plan:  Chronic pain of left knee  She was given prescription for Voltaren gel  Hyperlipidemia  Not well controlled  It was discussed about low-fat diet and regular exercise  I will check her fasting lipid profile  Patient does not desire to be on cholesterol medicine  Hypertension  Not well controlled in the office but well controlled at home  She is to continue same and monitor blood pressure and call me back in 1 week with measurements  If it is elevated I will consider increasing her losartan to 75 mg daily  Diagnoses and all orders for this visit:    Healthcare maintenance  Comments: It was discussed about immunizations, diet, exercise and safety measures  Mixed hyperlipidemia  -     Lipid Panel with Direct LDL reflex; Future    Chronic pain of left knee  -     diclofenac sodium (VOLTAREN) 1 %; Apply 2 g topically 4 (four) times a day    Essential hypertension  -     CBC and differential; Future  -     Comprehensive metabolic panel; Future  -     TSH, 3rd generation with Free T4 reflex; Future    Screening mammogram, encounter for  -     Mammo screening bilateral w cad; Future    Encounter for screening for malignant neoplasm of colon  -     Occult Blood, Fecal Immunochemical; Future          There are no Patient Instructions on file for this visit  Return in about 6 months (around 6/17/2020)  Subjective:      Patient ID: Doris Lovelace is a 71 y o  female  Chief Complaint   Patient presents with    Physical Exam       She is here today for wellness exam   She has been taking her losartan 100 mg half tablet daily  She stated her blood pressure is well controlled at home  It was elevated in the office today  She complained of left knee pain has been going on for a while  She is very active  He denies any recent history of injury or trauma        The following portions of the patient's history were reviewed and updated as appropriate: allergies, current medications, past family history, past medical history, past social history, past surgical history and problem list     Review of Systems   Constitutional: Negative for chills and fever  HENT: Negative for trouble swallowing  Eyes: Negative for visual disturbance  Respiratory: Negative for cough and shortness of breath  Cardiovascular: Negative for chest pain, palpitations and leg swelling  Gastrointestinal: Negative for abdominal pain, constipation and diarrhea  Endocrine: Negative for cold intolerance and heat intolerance  Genitourinary: Negative for difficulty urinating and dysuria  Musculoskeletal: Positive for arthralgias  Negative for gait problem  Skin: Negative for rash  Neurological: Negative for dizziness, tremors, seizures and headaches  Hematological: Negative for adenopathy  Psychiatric/Behavioral: Negative for behavioral problems  Current Outpatient Medications   Medication Sig Dispense Refill    losartan (COZAAR) 100 MG tablet Take 1 tablet (100 mg total) by mouth daily 30 tablet 0    diclofenac sodium (VOLTAREN) 1 % Apply 2 g topically 4 (four) times a day 3 Tube 3    fluticasone (FLONASE) 50 mcg/act nasal spray 1 spray into each nostril daily (Patient not taking: Reported on 7/19/2019) 16 g 0    loratadine (CLARITIN REDITABS) 10 MG dissolvable tablet Take 1 tablet (10 mg total) by mouth daily for 14 days 14 tablet 0    losartan (COZAAR) 100 MG tablet TAKE ONE TABLET BY MOUTH EVERY DAY (Patient not taking: Reported on 12/17/2019) 30 tablet 3    rosuvastatin (CRESTOR) 5 mg tablet every 24 hours      traMADol (ULTRAM) 50 mg tablet Take 1 tablet (50 mg total) by mouth every 8 (eight) hours as needed for moderate pain (Patient not taking: Reported on 5/20/2019) 30 tablet 0     No current facility-administered medications for this visit          Objective:    /88 (BP Location: Left arm, Patient Position: Sitting, Cuff Size: Adult)   Pulse 94   Temp 97 9 °F (36 6 °C) (Tympanic)   Resp 16   Ht 5' 4" (1 626 m)   Wt 64 9 kg (143 lb 2 oz)   SpO2 98%   BMI 24 57 kg/m²        Physical Exam   Constitutional: She is oriented to person, place, and time  She appears well-developed and well-nourished  HENT:   Head: Normocephalic and atraumatic  Eyes: Pupils are equal, round, and reactive to light  EOM are normal    Neck: Normal range of motion  Neck supple  Cardiovascular: Normal rate, regular rhythm and normal heart sounds  Pulmonary/Chest: Effort normal and breath sounds normal    Abdominal: Soft  Bowel sounds are normal    Musculoskeletal: Normal range of motion  She exhibits no edema  Lymphadenopathy:     She has no cervical adenopathy  Neurological: She is alert and oriented to person, place, and time  No cranial nerve deficit  Skin: Skin is warm  Psychiatric: She has a normal mood and affect  Nursing note and vitals reviewed               Columba Rajput MD

## 2019-12-23 NOTE — TELEPHONE ENCOUNTER
Per [facility] Diclofenac 1 % gel approved, valid 12/19/19-12/19/20  L/M informing pt rx approved

## 2019-12-30 ENCOUNTER — TELEPHONE (OUTPATIENT)
Dept: FAMILY MEDICINE CLINIC | Facility: CLINIC | Age: 64
End: 2019-12-30

## 2019-12-30 NOTE — TELEPHONE ENCOUNTER
The risk from the gel when you apply topical it is less than taking 1 tablet ibuprofen/  Aleve  Tramadol is controlled substance and not recommended to be used for long period of time  I recommend referral to see orthopedic

## 2019-12-30 NOTE — TELEPHONE ENCOUNTER
Pt picked up the diclofenac gel and is very nervous on the warnings printed on label  She read that if you have high blood pressure which she does it can increase chance of heart attack  She will not use this gel and would like either tramadol or something else sent into pharmacy   Please advise

## 2019-12-31 ENCOUNTER — TRANSCRIBE ORDERS (OUTPATIENT)
Dept: ADMINISTRATIVE | Facility: HOSPITAL | Age: 64
End: 2019-12-31

## 2019-12-31 ENCOUNTER — APPOINTMENT (OUTPATIENT)
Dept: LAB | Facility: IMAGING CENTER | Age: 64
End: 2019-12-31
Payer: COMMERCIAL

## 2019-12-31 DIAGNOSIS — E78.2 MIXED HYPERLIPIDEMIA: ICD-10-CM

## 2019-12-31 DIAGNOSIS — I10 ESSENTIAL HYPERTENSION: ICD-10-CM

## 2019-12-31 LAB
ALBUMIN SERPL BCP-MCNC: 3.7 G/DL (ref 3.5–5)
ALP SERPL-CCNC: 85 U/L (ref 46–116)
ALT SERPL W P-5'-P-CCNC: 29 U/L (ref 12–78)
ANION GAP SERPL CALCULATED.3IONS-SCNC: 3 MMOL/L (ref 4–13)
AST SERPL W P-5'-P-CCNC: 22 U/L (ref 5–45)
BASOPHILS # BLD AUTO: 0.02 THOUSANDS/ΜL (ref 0–0.1)
BASOPHILS NFR BLD AUTO: 0 % (ref 0–1)
BILIRUB SERPL-MCNC: 0.47 MG/DL (ref 0.2–1)
BUN SERPL-MCNC: 21 MG/DL (ref 5–25)
CALCIUM SERPL-MCNC: 9.4 MG/DL (ref 8.3–10.1)
CHLORIDE SERPL-SCNC: 108 MMOL/L (ref 100–108)
CHOLEST SERPL-MCNC: 272 MG/DL (ref 50–200)
CO2 SERPL-SCNC: 30 MMOL/L (ref 21–32)
CREAT SERPL-MCNC: 0.71 MG/DL (ref 0.6–1.3)
EOSINOPHIL # BLD AUTO: 0.23 THOUSAND/ΜL (ref 0–0.61)
EOSINOPHIL NFR BLD AUTO: 5 % (ref 0–6)
ERYTHROCYTE [DISTWIDTH] IN BLOOD BY AUTOMATED COUNT: 12.6 % (ref 11.6–15.1)
GFR SERPL CREATININE-BSD FRML MDRD: 90 ML/MIN/1.73SQ M
GLUCOSE P FAST SERPL-MCNC: 94 MG/DL (ref 65–99)
HCT VFR BLD AUTO: 41.9 % (ref 34.8–46.1)
HDLC SERPL-MCNC: 95 MG/DL
HGB BLD-MCNC: 13.3 G/DL (ref 11.5–15.4)
IMM GRANULOCYTES # BLD AUTO: 0.01 THOUSAND/UL (ref 0–0.2)
IMM GRANULOCYTES NFR BLD AUTO: 0 % (ref 0–2)
LDLC SERPL CALC-MCNC: 163 MG/DL (ref 0–100)
LYMPHOCYTES # BLD AUTO: 1.45 THOUSANDS/ΜL (ref 0.6–4.47)
LYMPHOCYTES NFR BLD AUTO: 30 % (ref 14–44)
MCH RBC QN AUTO: 29.5 PG (ref 26.8–34.3)
MCHC RBC AUTO-ENTMCNC: 31.7 G/DL (ref 31.4–37.4)
MCV RBC AUTO: 93 FL (ref 82–98)
MONOCYTES # BLD AUTO: 0.44 THOUSAND/ΜL (ref 0.17–1.22)
MONOCYTES NFR BLD AUTO: 9 % (ref 4–12)
NEUTROPHILS # BLD AUTO: 2.72 THOUSANDS/ΜL (ref 1.85–7.62)
NEUTS SEG NFR BLD AUTO: 56 % (ref 43–75)
NRBC BLD AUTO-RTO: 0 /100 WBCS
PLATELET # BLD AUTO: 333 THOUSANDS/UL (ref 149–390)
PMV BLD AUTO: 9.7 FL (ref 8.9–12.7)
POTASSIUM SERPL-SCNC: 4.3 MMOL/L (ref 3.5–5.3)
PROT SERPL-MCNC: 7.2 G/DL (ref 6.4–8.2)
RBC # BLD AUTO: 4.51 MILLION/UL (ref 3.81–5.12)
SODIUM SERPL-SCNC: 141 MMOL/L (ref 136–145)
TRIGL SERPL-MCNC: 68 MG/DL
TSH SERPL DL<=0.05 MIU/L-ACNC: 1.2 UIU/ML (ref 0.36–3.74)
WBC # BLD AUTO: 4.87 THOUSAND/UL (ref 4.31–10.16)

## 2019-12-31 PROCEDURE — 84443 ASSAY THYROID STIM HORMONE: CPT

## 2019-12-31 PROCEDURE — 80053 COMPREHEN METABOLIC PANEL: CPT

## 2019-12-31 PROCEDURE — 85025 COMPLETE CBC W/AUTO DIFF WBC: CPT

## 2019-12-31 PROCEDURE — 80061 LIPID PANEL: CPT

## 2019-12-31 PROCEDURE — 36415 COLL VENOUS BLD VENIPUNCTURE: CPT

## 2019-12-31 NOTE — TELEPHONE ENCOUNTER
Pt is aware of this advise and refuses to go to anymore doctors for further treatment when I mentioned ortho

## 2020-01-02 ENCOUNTER — TELEPHONE (OUTPATIENT)
Dept: FAMILY MEDICINE CLINIC | Facility: CLINIC | Age: 65
End: 2020-01-02

## 2020-01-02 DIAGNOSIS — E78.5 HYPERLIPIDEMIA, UNSPECIFIED HYPERLIPIDEMIA TYPE: Primary | ICD-10-CM

## 2020-01-02 RX ORDER — ATORVASTATIN CALCIUM 10 MG/1
10 TABLET, FILM COATED ORAL DAILY
Qty: 30 TABLET | Refills: 3 | Status: SHIPPED | OUTPATIENT
Start: 2020-01-02 | End: 2020-06-21

## 2020-01-02 NOTE — TELEPHONE ENCOUNTER
I sent prescription for Lipitor 10 mg   I recommend repeat blood work in 3 months including CMP and fasting lipid profile

## 2020-01-02 NOTE — TELEPHONE ENCOUNTER
Pt aware of labs and would like to start off with lipitor 10 mg as she didn't feel well last time she was started on medication for cholesterol  She will then increase it depending on how she feels  Is this okay?  Please advise

## 2020-01-02 NOTE — TELEPHONE ENCOUNTER
----- Message from Clarke Spivey MD sent at 1/2/2020  7:09 AM EST -----  Blood work came back showing high cholesterol  I recommend Lipitor 20 mg q h s     Please check with patient she is agreeable       All the rest of the blood work came back normal

## 2020-01-02 NOTE — TELEPHONE ENCOUNTER
Checked communication form and left detailed message for pt but did not mention medication just what we discussed earlier today   She is to call with any questions

## 2020-04-12 ENCOUNTER — NURSE TRIAGE (OUTPATIENT)
Dept: OTHER | Facility: OTHER | Age: 65
End: 2020-04-12

## 2020-04-12 ENCOUNTER — TELEPHONE (OUTPATIENT)
Dept: OTHER | Facility: OTHER | Age: 65
End: 2020-04-12

## 2020-04-13 ENCOUNTER — TELEMEDICINE (OUTPATIENT)
Dept: FAMILY MEDICINE CLINIC | Facility: CLINIC | Age: 65
End: 2020-04-13
Payer: COMMERCIAL

## 2020-04-13 VITALS — WEIGHT: 143 LBS | BODY MASS INDEX: 24.55 KG/M2 | TEMPERATURE: 98 F

## 2020-04-13 DIAGNOSIS — J01.00 ACUTE MAXILLARY SINUSITIS, RECURRENCE NOT SPECIFIED: ICD-10-CM

## 2020-04-13 PROCEDURE — 1160F RVW MEDS BY RX/DR IN RCRD: CPT | Performed by: FAMILY MEDICINE

## 2020-04-13 PROCEDURE — 99214 OFFICE O/P EST MOD 30 MIN: CPT | Performed by: FAMILY MEDICINE

## 2020-04-13 RX ORDER — AZITHROMYCIN 250 MG/1
TABLET, FILM COATED ORAL
Qty: 6 TABLET | Refills: 0 | Status: SHIPPED | OUTPATIENT
Start: 2020-04-13 | End: 2020-04-17

## 2020-04-13 RX ORDER — FLUTICASONE PROPIONATE 50 MCG
1 SPRAY, SUSPENSION (ML) NASAL DAILY
Qty: 16 G | Refills: 0 | Status: ON HOLD | OUTPATIENT
Start: 2020-04-13 | End: 2022-01-21 | Stop reason: ALTCHOICE

## 2020-06-20 DIAGNOSIS — E78.5 HYPERLIPIDEMIA, UNSPECIFIED HYPERLIPIDEMIA TYPE: ICD-10-CM

## 2020-06-20 DIAGNOSIS — I10 ESSENTIAL HYPERTENSION: ICD-10-CM

## 2020-06-21 RX ORDER — ATORVASTATIN CALCIUM 10 MG/1
TABLET, FILM COATED ORAL
Qty: 30 TABLET | Refills: 3 | Status: SHIPPED | OUTPATIENT
Start: 2020-06-21 | End: 2020-06-22

## 2020-06-21 RX ORDER — LOSARTAN POTASSIUM 100 MG/1
100 TABLET ORAL DAILY
Qty: 90 TABLET | Refills: 1 | Status: SHIPPED | OUTPATIENT
Start: 2020-06-21 | End: 2020-06-22

## 2020-06-22 DIAGNOSIS — E78.5 HYPERLIPIDEMIA, UNSPECIFIED HYPERLIPIDEMIA TYPE: ICD-10-CM

## 2020-06-22 DIAGNOSIS — I10 ESSENTIAL HYPERTENSION: ICD-10-CM

## 2020-06-22 RX ORDER — ATORVASTATIN CALCIUM 10 MG/1
TABLET, FILM COATED ORAL
Qty: 30 TABLET | Refills: 3 | Status: SHIPPED | OUTPATIENT
Start: 2020-06-22 | End: 2020-11-18

## 2020-06-22 RX ORDER — LOSARTAN POTASSIUM 100 MG/1
TABLET ORAL
Qty: 30 TABLET | Refills: 3 | Status: SHIPPED | OUTPATIENT
Start: 2020-06-22 | End: 2021-04-26

## 2020-08-06 ENCOUNTER — OFFICE VISIT (OUTPATIENT)
Dept: FAMILY MEDICINE CLINIC | Facility: CLINIC | Age: 65
End: 2020-08-06
Payer: COMMERCIAL

## 2020-08-06 ENCOUNTER — HOSPITAL ENCOUNTER (OUTPATIENT)
Dept: RADIOLOGY | Facility: IMAGING CENTER | Age: 65
Discharge: HOME | End: 2020-08-06
Payer: COMMERCIAL

## 2020-08-06 ENCOUNTER — APPOINTMENT (OUTPATIENT)
Dept: LAB | Facility: IMAGING CENTER | Age: 65
End: 2020-08-06
Payer: COMMERCIAL

## 2020-08-06 ENCOUNTER — TELEPHONE (OUTPATIENT)
Dept: FAMILY MEDICINE CLINIC | Facility: CLINIC | Age: 65
End: 2020-08-06

## 2020-08-06 VITALS
TEMPERATURE: 98.1 F | DIASTOLIC BLOOD PRESSURE: 78 MMHG | RESPIRATION RATE: 16 BRPM | OXYGEN SATURATION: 97 % | BODY MASS INDEX: 25.31 KG/M2 | HEART RATE: 78 BPM | HEIGHT: 64 IN | SYSTOLIC BLOOD PRESSURE: 130 MMHG | WEIGHT: 148.25 LBS

## 2020-08-06 DIAGNOSIS — M25.561 ACUTE PAIN OF RIGHT KNEE: ICD-10-CM

## 2020-08-06 DIAGNOSIS — Z23 ENCOUNTER FOR IMMUNIZATION: ICD-10-CM

## 2020-08-06 DIAGNOSIS — E78.2 MIXED HYPERLIPIDEMIA: ICD-10-CM

## 2020-08-06 DIAGNOSIS — I10 ESSENTIAL HYPERTENSION: ICD-10-CM

## 2020-08-06 DIAGNOSIS — S89.91XA INJURY OF RIGHT KNEE, INITIAL ENCOUNTER: Primary | ICD-10-CM

## 2020-08-06 DIAGNOSIS — E78.5 HYPERLIPIDEMIA, UNSPECIFIED HYPERLIPIDEMIA TYPE: ICD-10-CM

## 2020-08-06 DIAGNOSIS — S89.91XA INJURY OF RIGHT KNEE, INITIAL ENCOUNTER: ICD-10-CM

## 2020-08-06 LAB
ALBUMIN SERPL BCP-MCNC: 3.8 G/DL (ref 3.5–5)
ALP SERPL-CCNC: 88 U/L (ref 46–116)
ALT SERPL W P-5'-P-CCNC: 24 U/L (ref 12–78)
ANION GAP SERPL CALCULATED.3IONS-SCNC: 3 MMOL/L (ref 4–13)
AST SERPL W P-5'-P-CCNC: 19 U/L (ref 5–45)
BILIRUB SERPL-MCNC: 0.45 MG/DL (ref 0.2–1)
BUN SERPL-MCNC: 16 MG/DL (ref 5–25)
CALCIUM SERPL-MCNC: 9.9 MG/DL (ref 8.3–10.1)
CHLORIDE SERPL-SCNC: 108 MMOL/L (ref 100–108)
CHOLEST SERPL-MCNC: 227 MG/DL (ref 50–200)
CO2 SERPL-SCNC: 31 MMOL/L (ref 21–32)
CREAT SERPL-MCNC: 0.67 MG/DL (ref 0.6–1.3)
GFR SERPL CREATININE-BSD FRML MDRD: 92 ML/MIN/1.73SQ M
GLUCOSE P FAST SERPL-MCNC: 91 MG/DL (ref 65–99)
HDLC SERPL-MCNC: 86 MG/DL
LDLC SERPL CALC-MCNC: 126 MG/DL (ref 0–100)
NONHDLC SERPL-MCNC: 141 MG/DL
POTASSIUM SERPL-SCNC: 3.9 MMOL/L (ref 3.5–5.3)
PROT SERPL-MCNC: 7.6 G/DL (ref 6.4–8.2)
SODIUM SERPL-SCNC: 142 MMOL/L (ref 136–145)
TRIGL SERPL-MCNC: 74 MG/DL

## 2020-08-06 PROCEDURE — 1160F RVW MEDS BY RX/DR IN RCRD: CPT | Performed by: FAMILY MEDICINE

## 2020-08-06 PROCEDURE — 90471 IMMUNIZATION ADMIN: CPT

## 2020-08-06 PROCEDURE — 90715 TDAP VACCINE 7 YRS/> IM: CPT

## 2020-08-06 PROCEDURE — 80061 LIPID PANEL: CPT

## 2020-08-06 PROCEDURE — 99214 OFFICE O/P EST MOD 30 MIN: CPT | Performed by: FAMILY MEDICINE

## 2020-08-06 PROCEDURE — 3075F SYST BP GE 130 - 139MM HG: CPT | Performed by: FAMILY MEDICINE

## 2020-08-06 PROCEDURE — 1036F TOBACCO NON-USER: CPT | Performed by: FAMILY MEDICINE

## 2020-08-06 PROCEDURE — 3725F SCREEN DEPRESSION PERFORMED: CPT | Performed by: FAMILY MEDICINE

## 2020-08-06 PROCEDURE — 3078F DIAST BP <80 MM HG: CPT | Performed by: FAMILY MEDICINE

## 2020-08-06 PROCEDURE — 80053 COMPREHEN METABOLIC PANEL: CPT

## 2020-08-06 PROCEDURE — 3008F BODY MASS INDEX DOCD: CPT | Performed by: FAMILY MEDICINE

## 2020-08-06 PROCEDURE — 36415 COLL VENOUS BLD VENIPUNCTURE: CPT

## 2020-08-06 PROCEDURE — 73564 X-RAY EXAM KNEE 4 OR MORE: CPT

## 2020-08-06 RX ORDER — PREDNISONE 50 MG/1
50 TABLET ORAL DAILY
Qty: 5 TABLET | Refills: 0 | Status: SHIPPED | OUTPATIENT
Start: 2020-08-06 | End: 2020-08-11

## 2020-08-06 NOTE — ASSESSMENT & PLAN NOTE
She has been able to tolerate half tablet daily  She is going to get her blood work done today  Will continue to monitor

## 2020-08-06 NOTE — PROGRESS NOTES
Assessment/Plan:  Hypertension  Well controlled  Continue to monitor  Hyperlipidemia  She has been able to tolerate half tablet daily  She is going to get her blood work done today  Will continue to monitor  Injury of right knee  I am going to check x-ray of her right knee  Acute pain of right knee  She was given prescription for prednisone 50 mg 1 tablet daily for 5 days  She was told when she is done with her prednisone to take ibuprofen 600 mg 3 times a day on full stomach  If symptoms are not improved in 2 weeks call back and I will refer to see Ortho  Diagnoses and all orders for this visit:    Injury of right knee, initial encounter  -     XR knee 4+ vw right injury; Future    Acute pain of right knee  -     XR knee 4+ vw right injury; Future  -     predniSONE 50 mg tablet; Take 1 tablet (50 mg total) by mouth daily for 5 days    Essential hypertension    Mixed hyperlipidemia    Encounter for immunization  -     TDAP VACCINE GREATER THAN OR EQUAL TO 8YO IM    BMI 25 0-25 9,adult          There are no Patient Instructions on file for this visit  Return in about 4 months (around 12/6/2020)  Subjective:      Patient ID: Margie Reinoso is a 76 y o  female  Chief Complaint   Patient presents with    Knee Pain       She is here today for follow-up multiple medical problems and with complaint of right knee pain started couple weeks ago after she fell on her knee  She denies any bruising but she feels her knee is swollen  It is painful when she gets up in the morning  She has been taking her Advil once a day for pain  She has been taking her atorvastatin half tablet daily and she has been able to tolerate it  She monitors her blood pressure at home it has been well controlled      Knee Pain          The following portions of the patient's history were reviewed and updated as appropriate: allergies, current medications, past family history, past medical history, past social history, past surgical history and problem list     Review of Systems   Constitutional: Negative for chills and fever  HENT: Negative for trouble swallowing  Eyes: Negative for visual disturbance  Respiratory: Negative for cough and shortness of breath  Cardiovascular: Negative for chest pain, palpitations and leg swelling  Gastrointestinal: Negative for abdominal pain, constipation and diarrhea  Endocrine: Negative for cold intolerance and heat intolerance  Genitourinary: Negative for difficulty urinating and dysuria  Musculoskeletal: Negative for gait problem  Right knee pain and swelling   Skin: Negative for rash  Neurological: Negative for dizziness, tremors, seizures and headaches  Hematological: Negative for adenopathy  Psychiatric/Behavioral: Negative for behavioral problems  Current Outpatient Medications   Medication Sig Dispense Refill    atorvastatin (LIPITOR) 10 mg tablet TAKE ONE TABLET BY MOUTH EVERY DAY 30 tablet 3    losartan (COZAAR) 100 MG tablet TAKE ONE TABLET BY MOUTH EVERY DAY 30 tablet 3    diclofenac sodium (VOLTAREN) 1 % Apply 2 g topically 4 (four) times a day (Patient not taking: Reported on 4/13/2020) 3 Tube 3    fluticasone (FLONASE) 50 mcg/act nasal spray 1 spray into each nostril daily (Patient not taking: Reported on 8/6/2020) 16 g 0    loratadine (CLARITIN REDITABS) 10 MG dissolvable tablet Take 1 tablet (10 mg total) by mouth daily for 14 days 14 tablet 0    predniSONE 50 mg tablet Take 1 tablet (50 mg total) by mouth daily for 5 days 5 tablet 0     No current facility-administered medications for this visit  Objective:    /78 (BP Location: Left arm, Patient Position: Sitting, Cuff Size: Adult)   Pulse 78   Temp 98 1 °F (36 7 °C) (Tympanic)   Resp 16   Ht 5' 4" (1 626 m)   Wt 67 2 kg (148 lb 4 oz)   SpO2 97%   BMI 25 45 kg/m²        Physical Exam   Constitutional: She is oriented to person, place, and time   She appears well-developed  HENT:   Head: Normocephalic and atraumatic  Eyes: Pupils are equal, round, and reactive to light  Neck: Normal range of motion  Neck supple  Cardiovascular: Normal rate, regular rhythm and normal heart sounds  Pulmonary/Chest: Effort normal and breath sounds normal    Abdominal: Soft  Bowel sounds are normal    Musculoskeletal: Normal range of motion  General: Swelling and tenderness (Tenderness to palpation on the medial aspect of the right knee with limited range of motion ) present  Lymphadenopathy:     She has no cervical adenopathy  Neurological: She is alert and oriented to person, place, and time  No cranial nerve deficit  Skin: Skin is warm  Nursing note and vitals reviewed  Chong Holland MD BMI Counseling: Body mass index is 25 45 kg/m²  The BMI is above normal  Nutrition recommendations include reducing portion sizes, decreasing overall calorie intake and 3-5 servings of fruits/vegetables daily  Exercise recommendations include moderate aerobic physical activity for 150 minutes/week

## 2020-08-06 NOTE — TELEPHONE ENCOUNTER
Patient was given prednisone at her visit today, also had a tdap    When reading about the prednisone it said that taking this when you have a immunization may affect the immunization

## 2020-08-06 NOTE — ASSESSMENT & PLAN NOTE
She was given prescription for prednisone 50 mg 1 tablet daily for 5 days  She was told when she is done with her prednisone to take ibuprofen 600 mg 3 times a day on full stomach  If symptoms are not improved in 2 weeks call back and I will refer to see Ortho

## 2020-08-07 NOTE — TELEPHONE ENCOUNTER
[facility] stating it is okay to take medication with immunization     Any questions to call office back  Communication form-okay to [facility]

## 2020-08-07 NOTE — TELEPHONE ENCOUNTER
Patient left another message asking if she can take advil etc with her steroid      Also asked about taking the prednisone with tdap

## 2020-08-10 ENCOUNTER — TELEPHONE (OUTPATIENT)
Dept: FAMILY MEDICINE CLINIC | Facility: CLINIC | Age: 65
End: 2020-08-10

## 2020-08-10 DIAGNOSIS — M17.10 ARTHRITIS OF KNEE: Primary | ICD-10-CM

## 2020-08-10 NOTE — TELEPHONE ENCOUNTER
----- Message from David Portillo MD sent at 8/10/2020  7:16 AM EDT -----  Blood work showed high cholesterol but improving from before  Continue same  Will continue to monitor

## 2020-08-17 ENCOUNTER — CONSULT (OUTPATIENT)
Dept: OBGYN CLINIC | Facility: MEDICAL CENTER | Age: 65
End: 2020-08-17
Payer: COMMERCIAL

## 2020-08-17 VITALS
WEIGHT: 148 LBS | TEMPERATURE: 98.5 F | SYSTOLIC BLOOD PRESSURE: 137 MMHG | BODY MASS INDEX: 25.27 KG/M2 | HEIGHT: 64 IN | DIASTOLIC BLOOD PRESSURE: 75 MMHG | HEART RATE: 74 BPM

## 2020-08-17 DIAGNOSIS — M17.10 ARTHRITIS OF KNEE: ICD-10-CM

## 2020-08-17 PROCEDURE — 1036F TOBACCO NON-USER: CPT | Performed by: ORTHOPAEDIC SURGERY

## 2020-08-17 PROCEDURE — 3008F BODY MASS INDEX DOCD: CPT | Performed by: ORTHOPAEDIC SURGERY

## 2020-08-17 PROCEDURE — 1160F RVW MEDS BY RX/DR IN RCRD: CPT | Performed by: ORTHOPAEDIC SURGERY

## 2020-08-17 PROCEDURE — 3075F SYST BP GE 130 - 139MM HG: CPT | Performed by: ORTHOPAEDIC SURGERY

## 2020-08-17 PROCEDURE — 3078F DIAST BP <80 MM HG: CPT | Performed by: ORTHOPAEDIC SURGERY

## 2020-08-17 PROCEDURE — 99243 OFF/OP CNSLTJ NEW/EST LOW 30: CPT | Performed by: ORTHOPAEDIC SURGERY

## 2020-08-17 NOTE — LETTER
August 17, 2020     Ani Oro MD  [address]  [address]  [facility] [address]    Patient: Thuy Hamlin   YOB: 1955   Date of Visit: 8/17/2020       Dear Dr Jacquie Dewitt: Thank you for referring Peewee Quintana to me for evaluation  Below are my notes for this consultation  If you have questions, please do not hesitate to call me  I look forward to following your patient along with you  Sincerely,        Breonna Infante DO        CC: No Recipients  Breonna Infante DO  8/17/2020 11:12 AM  Signed  Ortho Sports Medicine Knee New Patient Visit     Assesment:   76 y o  female right knee patellofemoral severe osteoarthritis    Plan:    -Discussed treatment options today  Patient made informed decision to proceed with bracing and physical therapy    -Follow-up in 2 months to consider injections for right knee OA  Conservative treatment:    OTC NSAIDS prn for pain  Hinged knee brace ordered  Imaging: All imaging from today was reviewed by myself and explained to the patient  Reveals severe right knee patellofemoral OA  Injection:    No Injection planned at this time  Surgery:     No surgery is recommended at this point, continue with conservative treatment plan as noted  Follow up:    Return in about 2 months (around 10/17/2020)  Chief Complaint   Patient presents with    Right Knee - Pain       History of Present Illness: The patient is a 76 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[employer], 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, seen in clinic for consultation of right knee pain  Pain is located medial   The patient rates the pain as a 10/10  The pain has been present for 5 weeks  The patient denies any recent injuries  The pain is characterized as sharp  The pain is present at all times  Pain is improved by rest and NSAIDS  Pain is aggravated by stairs, squatting, weight bearing and walking      Symptoms include swelling  The patient has tried rest and NSAIDS  Knee Surgical History:  None    Past Medical, Social and Family History:  Past Medical History:   Diagnosis Date    Anxiety 2/24/2012    Asthma     Hyperlipidemia     Hypertension     Osteoarthritis 6/5/2007    Skin cancer 07/16/2015    right thigh     Past Surgical History:   Procedure Laterality Date    TONSILLECTOMY      WISDOM TOOTH EXTRACTION       Allergies   Allergen Reactions    No Active Allergies      Current Outpatient Medications on File Prior to Visit   Medication Sig Dispense Refill    atorvastatin (LIPITOR) 10 mg tablet TAKE ONE TABLET BY MOUTH EVERY DAY 30 tablet 3    losartan (COZAAR) 100 MG tablet TAKE ONE TABLET BY MOUTH EVERY DAY 30 tablet 3    fluticasone (FLONASE) 50 mcg/act nasal spray 1 spray into each nostril daily (Patient not taking: Reported on 8/17/2020) 16 g 0    loratadine (CLARITIN REDITABS) 10 MG dissolvable tablet Take 1 tablet (10 mg total) by mouth daily for 14 days 14 tablet 0    [DISCONTINUED] diclofenac sodium (VOLTAREN) 1 % Apply 2 g topically 4 (four) times a day (Patient not taking: Reported on 8/17/2020) 3 Tube 3     No current facility-administered medications on file prior to visit  Social History     Socioeconomic History    Marital status: /Civil Union     Spouse name: Not on file    Number of children: Not on file    Years of education: Not on file    Highest education level: Not on file   Occupational History    Not on file   Social Needs    Financial resource strain: Not on file    Food insecurity     Worry: Not on file     Inability: Not on file   [employer] needs     Medical: Not on file     Non-medical: Not on file   Tobacco Use    Smoking status: Former Smoker    Smokeless tobacco: Never Used    Tobacco comment: "I smoked in college, long ago  ; never a smoker & no passive smoke exposure as per [employer]   Substance and Sexual Activity    Alcohol use:  Yes Comment: "rarely"    Drug use: No    Sexual activity: Not on file   Lifestyle    Physical activity     Days per week: Not on file     Minutes per session: Not on file    Stress: Not on file   Relationships    Social connections     Talks on phone: Not on file     Gets together: Not on file     Attends L[employer] service: Not on file     Active member of club or organization: Not on file     Attends meetings of clubs or organizations: Not on file     Relationship status: Not on file    Intimate partner violence     Fear of current or ex partner: Not on file     Emotionally abused: Not on file     Physically abused: Not on file     Forced sexual activity: Not on file   Other Topics Concern    Not on file   Social History Narrative    Not on file         I have reviewed the past medical, surgical, social and family history, medications and allergies as documented in the EMR  Review of systems: ROS is negative other than that noted in the HPI  Constitutional: Negative for fatigue and fever  HENT: Negative for sore throat  Respiratory: Negative for shortness of breath  Cardiovascular: Negative for chest pain  Gastrointestinal: Negative for abdominal pain  Endocrine: Negative for cold intolerance and heat intolerance  Genitourinary: Negative for flank pain  Musculoskeletal: Negative for back pain  Skin: Negative for rash  Allergic/Immunologic: Negative for immunocompromised state  Neurological: Negative for dizziness  Psychiatric/Behavioral: Negative for agitation  Physical Exam:    Blood pressure 137/75, pulse 74, temperature 98 5 °F (36 9 °C), height 5' 4" (1 626 m), weight 67 1 kg (148 lb)      General/Constitutional: NAD, well developed, well nourished  HENT: Normocephalic, atraumatic  CV: Intact distal pulses, regular rate  Resp: No respiratory distress or labored breathing  Lymphatic: No lymphadenopathy palpated  Neuro: Alert and Oriented x 3, no focal deficits  Psych: Normal mood, normal affect, normal judgement, normal behavior  Skin: Warm, dry, no rashes, no erythema      Knee Exam (focused): RIGHT LEFT   ROM:   0-130 0-130   Palpation: Effusion negative negative     MJL tenderness Negative Negative     LJL tenderness Negative Negative   Meniscus: Alejandra Negative Negative    Apley's Compression Negative Negative   Instability: Varus stable stable     Valgus stable stable   Special Tests: Lachman Negative Negative     Posterior drawer Negative Negative     Anterior drawer Negative Negative     Pivot shift not tested not tested     Dial not tested not tested   Patella: Palpation medial facet ttp and no tenderness no tenderness     Mobility    1/4     Apprehension Negative Negative   Other: Single leg 1/4 squat not tested not tested      LE NV Exam: +2 DP/PT pulses bilaterally  Sensation intact to light touch L2-S1 bilaterally     Bilateral hip ROM demonstrates no pain actively or passively    No calf tenderness to palpation bilaterally    Knee Imaging    X-rays of the right knee were reviewed, which demonstrate severe patellofemoral space OA with mild medial and lateral joint osteoarthritis  I have reviewed the radiology report and agree with their impression  Michel Cary DO  8/17/2020 11:09 AM  Attested  Ortho Sports Medicine Knee New Patient Visit     Assesment:   76 y o  female right knee patellofemoral severe osteoarthritis    Plan:    -Discussed treatment options today  Patient made informed decision to proceed with bracing and physical therapy    -Follow-up in 2 months to consider injections for right knee OA  Conservative treatment:    OTC NSAIDS prn for pain  Hinged knee brace ordered  Imaging: All imaging from today was reviewed by myself and explained to the patient  Reveals severe right knee patellofemoral OA  Injection:    No Injection planned at this time        Surgery:     No surgery is recommended at this point, continue with conservative treatment plan as noted  Follow up:    No follow-ups on file  Chief Complaint   Patient presents with    Right Knee - Pain       History of Present Illness: The patient is a 76 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[employer], 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, seen in clinic for consultation of right knee pain  Pain is located medial   The patient rates the pain as a 10/10  The pain has been present for 5 weeks  The patient denies any recent injuries  The pain is characterized as sharp  The pain is present at all times  Pain is improved by rest and NSAIDS  Pain is aggravated by stairs, squatting, weight bearing and walking  Symptoms include swelling  The patient has tried rest and NSAIDS  Knee Surgical History:  None    Past Medical, Social and Family History:  Past Medical History:   Diagnosis Date    Anxiety 2/24/2012    Asthma     Hyperlipidemia     Hypertension     Osteoarthritis 6/5/2007    Skin cancer 07/16/2015    right thigh     Past Surgical History:   Procedure Laterality Date    TONSILLECTOMY      WISDOM TOOTH EXTRACTION       Allergies   Allergen Reactions    No Active Allergies      Current Outpatient Medications on File Prior to Visit   Medication Sig Dispense Refill    atorvastatin (LIPITOR) 10 mg tablet TAKE ONE TABLET BY MOUTH EVERY DAY 30 tablet 3    losartan (COZAAR) 100 MG tablet TAKE ONE TABLET BY MOUTH EVERY DAY 30 tablet 3    diclofenac sodium (VOLTAREN) 1 % Apply 2 g topically 4 (four) times a day (Patient not taking: Reported on 8/17/2020) 3 Tube 3    fluticasone (FLONASE) 50 mcg/act nasal spray 1 spray into each nostril daily (Patient not taking: Reported on 8/17/2020) 16 g 0    loratadine (CLARITIN REDITABS) 10 MG dissolvable tablet Take 1 tablet (10 mg total) by mouth daily for 14 days 14 tablet 0     No current facility-administered medications on file prior to visit        Social History     Socioeconomic History    Marital status: /Civil Union     Spouse name: Not on file    Number of children: Not on file    Years of education: Not on file    Highest education level: Not on file   Occupational History    Not on file   Social Needs    Financial resource strain: Not on file    Food insecurity     Worry: Not on file     Inability: Not on file    Transportation needs     Medical: Not on file     Non-medical: Not on file   Tobacco Use    Smoking status: Former Smoker    Smokeless tobacco: Never Used    Tobacco comment: "I smoked in college, long ago  ; never a smoker & no passive smoke exposure as per [employer]   Substance and Sexual Activity    Alcohol use: Yes     Comment: "rarely"    Drug use: No    Sexual activity: Not on file   Lifestyle    Physical activity     Days per week: Not on file     Minutes per session: Not on file    Stress: Not on file   Relationships    Social connections     Talks on phone: Not on file     Gets together: Not on file     Attends [employer] service: Not on file     Active member of club or organization: Not on file     Attends meetings of clubs or organizations: Not on file     Relationship status: Not on file    Intimate partner violence     Fear of current or ex partner: Not on file     Emotionally abused: Not on file     Physically abused: Not on file     Forced sexual activity: Not on file   Other Topics Concern    Not on file   Social History Narrative    Not on file         I have reviewed the past medical, surgical, social and family history, medications and allergies as documented in the EMR  Review of systems: ROS is negative other than that noted in the HPI  Constitutional: Negative for fatigue and fever  HENT: Negative for sore throat  Respiratory: Negative for shortness of breath  Cardiovascular: Negative for chest pain  Gastrointestinal: Negative for abdominal pain     Endocrine: Negative for cold intolerance and heat intolerance  Genitourinary: Negative for flank pain  Musculoskeletal: Negative for back pain  Skin: Negative for rash  Allergic/Immunologic: Negative for immunocompromised state  Neurological: Negative for dizziness  Psychiatric/Behavioral: Negative for agitation  Physical Exam:    Blood pressure 137/75, pulse 74, temperature 98 5 °F (36 9 °C), height 5' 4" (1 626 m), weight 67 1 kg (148 lb)  General/Constitutional: NAD, well developed, well nourished  HENT: Normocephalic, atraumatic  CV: Intact distal pulses, regular rate  Resp: No respiratory distress or labored breathing  Lymphatic: No lymphadenopathy palpated  Neuro: Alert and Oriented x 3, no focal deficits  Psych: Normal mood, normal affect, normal judgement, normal behavior  Skin: Warm, dry, no rashes, no erythema      Knee Exam (focused): RIGHT LEFT   ROM:   0-130 0-130   Palpation: Effusion negative negative     MJL tenderness Negative Negative     LJL tenderness Negative Negative   Meniscus: Alejandra Negative Negative    Apley's Compression Negative Negative   Instability: Varus stable stable     Valgus stable stable   Special Tests: Lachman Negative Negative     Posterior drawer Negative Negative     Anterior drawer Negative Negative     Pivot shift not tested not tested     Dial not tested not tested   Patella: Palpation medial facet ttp and no tenderness no tenderness     Mobility    1/4     Apprehension Negative Negative   Other: Single leg 1/4 squat not tested not tested      LE NV Exam: +2 DP/PT pulses bilaterally  Sensation intact to light touch L2-S1 bilaterally     Bilateral hip ROM demonstrates no pain actively or passively    No calf tenderness to palpation bilaterally    Knee Imaging    X-rays of the right knee were reviewed, which demonstrate severe patellofemoral space OA  I have reviewed the radiology report and agree with their impression        Scribe Attestation    I,:    am acting as a scribe while in the presence of the attending physician :        I,:    personally performed the services described in this documentation    as scribed in my presence :            Attestation signed by Breonna Infante DO at 8/17/2020 11:11 AM:  Attending Physician Statement  I performed history and exam of patient  I discussed with the physician assistant  I agree with the documented findings and plan of care    Please see separate note from me during this office visit

## 2020-08-17 NOTE — PATIENT INSTRUCTIONS
Hinge Knee brace instructions:  1  Wear knee brace at all times for first 2 weeks  2  Next 2 weeks wear knee brace only when weight bearing  Brace not needed when sleeping    3  Next 2 weeks wear knee brace only with high impact activities such as running

## 2020-08-17 NOTE — PROGRESS NOTES
Ortho Sports Medicine Knee New Patient Visit     Assesment:   76 y o  female right knee patellofemoral severe osteoarthritis    Plan:    -Discussed treatment options today  Patient made informed decision to proceed with bracing and physical therapy    -Follow-up in 2 months to consider injections for right knee OA  Conservative treatment:    OTC NSAIDS prn for pain  Hinged knee brace ordered  Imaging: All imaging from today was reviewed by myself and explained to the patient  Reveals severe right knee patellofemoral OA  Injection:    No Injection planned at this time  Surgery:     No surgery is recommended at this point, continue with conservative treatment plan as noted  Follow up:    Return in about 2 months (around 10/17/2020)  Chief Complaint   Patient presents with    Right Knee - Pain       History of Present Illness: The patient is a 76 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[employer], 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, seen in clinic for consultation of right knee pain  Pain is located medial   The patient rates the pain as a 10/10  The pain has been present for 5 weeks  The patient denies any recent injuries  The pain is characterized as sharp  The pain is present at all times  Pain is improved by rest and NSAIDS  Pain is aggravated by stairs, squatting, weight bearing and walking  Symptoms include swelling  The patient has tried rest and NSAIDS            Knee Surgical History:  None    Past Medical, Social and Family History:  Past Medical History:   Diagnosis Date    Anxiety 2/24/2012    Asthma     Hyperlipidemia     Hypertension     Osteoarthritis 6/5/2007    Skin cancer 07/16/2015    right thigh     Past Surgical History:   Procedure Laterality Date    TONSILLECTOMY      WISDOM TOOTH EXTRACTION       Allergies   Allergen Reactions    No Active Allergies      Current Outpatient Medications on File Prior to Visit   Medication Sig Dispense Refill    atorvastatin (LIPITOR) 10 mg tablet TAKE ONE TABLET BY MOUTH EVERY DAY 30 tablet 3    losartan (COZAAR) 100 MG tablet TAKE ONE TABLET BY MOUTH EVERY DAY 30 tablet 3    fluticasone (FLONASE) 50 mcg/act nasal spray 1 spray into each nostril daily (Patient not taking: Reported on 8/17/2020) 16 g 0    loratadine (CLARITIN REDITABS) 10 MG dissolvable tablet Take 1 tablet (10 mg total) by mouth daily for 14 days 14 tablet 0    [DISCONTINUED] diclofenac sodium (VOLTAREN) 1 % Apply 2 g topically 4 (four) times a day (Patient not taking: Reported on 8/17/2020) 3 Tube 3     No current facility-administered medications on file prior to visit        Social History     Socioeconomic History    Marital status: /Civil Union     Spouse name: Not on file    Number of children: Not on file    Years of education: Not on file    Highest education level: Not on file   Occupational History    Not on file   Social Needs    Financial resource strain: Not on file    Food insecurity     Worry: Not on file     Inability: Not on file   [employer] needs     Medical: Not on file     Non-medical: Not on file   Tobacco Use    Smoking status: Former Smoker    Smokeless tobacco: Never Used    Tobacco comment: "I smoked in college, long ago  ; never a smoker & no passive smoke exposure as per [employer]   Substance and Sexual Activity    Alcohol use: Yes     Comment: "rarely"    Drug use: No    Sexual activity: Not on file   Lifestyle    Physical activity     Days per week: Not on file     Minutes per session: Not on file    Stress: Not on file   Relationships    Social connections     Talks on phone: Not on file     Gets together: Not on file     Attends [employer] service: Not on file     Active member of club or organization: Not on file     Attends meetings of clubs or organizations: Not on file     Relationship status: Not on file    Intimate partner violence     Fear of current or ex partner: Not on file     Emotionally abused: Not on file     Physically abused: Not on file     Forced sexual activity: Not on file   Other Topics Concern    Not on file   Social History Narrative    Not on file         I have reviewed the past medical, surgical, social and family history, medications and allergies as documented in the EMR  Review of systems: ROS is negative other than that noted in the HPI  Constitutional: Negative for fatigue and fever  HENT: Negative for sore throat  Respiratory: Negative for shortness of breath  Cardiovascular: Negative for chest pain  Gastrointestinal: Negative for abdominal pain  Endocrine: Negative for cold intolerance and heat intolerance  Genitourinary: Negative for flank pain  Musculoskeletal: Negative for back pain  Skin: Negative for rash  Allergic/Immunologic: Negative for immunocompromised state  Neurological: Negative for dizziness  Psychiatric/Behavioral: Negative for agitation  Physical Exam:    Blood pressure 137/75, pulse 74, temperature 98 5 °F (36 9 °C), height 5' 4" (1 626 m), weight 67 1 kg (148 lb)  General/Constitutional: NAD, well developed, well nourished  HENT: Normocephalic, atraumatic  CV: Intact distal pulses, regular rate  Resp: No respiratory distress or labored breathing  Lymphatic: No lymphadenopathy palpated  Neuro: Alert and Oriented x 3, no focal deficits  Psych: Normal mood, normal affect, normal judgement, normal behavior  Skin: Warm, dry, no rashes, no erythema      Knee Exam (focused): RIGHT LEFT   ROM:   0-130 0-130   Palpation: Effusion negative negative     MJL tenderness Negative Negative     LJL tenderness Negative Negative   Meniscus:  Alejandra Negative Negative    Apley's Compression Negative Negative   Instability: Varus stable stable     Valgus stable stable   Special Tests: Lachman Negative Negative     Posterior drawer Negative Negative     Anterior drawer Negative Negative     Pivot shift not tested not tested     Dial not tested not tested   Patella: Palpation medial facet ttp and no tenderness no tenderness     Mobility    1/4     Apprehension Negative Negative   Other: Single leg 1/4 squat not tested not tested      LE NV Exam: +2 DP/PT pulses bilaterally  Sensation intact to light touch L2-S1 bilaterally     Bilateral hip ROM demonstrates no pain actively or passively    No calf tenderness to palpation bilaterally    Knee Imaging    X-rays of the right knee were reviewed, which demonstrate severe patellofemoral space OA with mild medial and lateral joint osteoarthritis  I have reviewed the radiology report and agree with their impression

## 2020-08-17 NOTE — PROGRESS NOTES
Ortho Sports Medicine Knee New Patient Visit     Assesment:   76 y o  female right knee patellofemoral severe osteoarthritis    Plan:    -Discussed treatment options today  Patient made informed decision to proceed with bracing and physical therapy    -Follow-up in 2 months to consider injections for right knee OA  Conservative treatment:    OTC NSAIDS prn for pain  Hinged knee brace ordered  Imaging: All imaging from today was reviewed by myself and explained to the patient  Reveals severe right knee patellofemoral OA  Injection:    No Injection planned at this time  Surgery:     No surgery is recommended at this point, continue with conservative treatment plan as noted  Follow up:    No follow-ups on file  Chief Complaint   Patient presents with    Right Knee - Pain       History of Present Illness: The patient is a 76 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[employer], 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, seen in clinic for consultation of right knee pain  Pain is located medial   The patient rates the pain as a 10/10  The pain has been present for 5 weeks  The patient denies any recent injuries  The pain is characterized as sharp  The pain is present at all times  Pain is improved by rest and NSAIDS  Pain is aggravated by stairs, squatting, weight bearing and walking  Symptoms include swelling  The patient has tried rest and NSAIDS            Knee Surgical History:  None    Past Medical, Social and Family History:  Past Medical History:   Diagnosis Date    Anxiety 2/24/2012    Asthma     Hyperlipidemia     Hypertension     Osteoarthritis 6/5/2007    Skin cancer 07/16/2015    right thigh     Past Surgical History:   Procedure Laterality Date    TONSILLECTOMY      WISDOM TOOTH EXTRACTION       Allergies   Allergen Reactions    No Active Allergies      Current Outpatient Medications on File Prior to Visit   Medication Sig Dispense Refill    atorvastatin (LIPITOR) 10 mg tablet TAKE ONE TABLET BY MOUTH EVERY DAY 30 tablet 3    losartan (COZAAR) 100 MG tablet TAKE ONE TABLET BY MOUTH EVERY DAY 30 tablet 3    diclofenac sodium (VOLTAREN) 1 % Apply 2 g topically 4 (four) times a day (Patient not taking: Reported on 8/17/2020) 3 Tube 3    fluticasone (FLONASE) 50 mcg/act nasal spray 1 spray into each nostril daily (Patient not taking: Reported on 8/17/2020) 16 g 0    loratadine (CLARITIN REDITABS) 10 MG dissolvable tablet Take 1 tablet (10 mg total) by mouth daily for 14 days 14 tablet 0     No current facility-administered medications on file prior to visit        Social History     Socioeconomic History    Marital status: /Civil Union     Spouse name: Not on file    Number of children: Not on file    Years of education: Not on file    Highest education level: Not on file   Occupational History    Not on file   Social Needs    Financial resource strain: Not on file    Food insecurity     Worry: Not on file     Inability: Not on file   [employer] needs     Medical: Not on file     Non-medical: Not on file   Tobacco Use    Smoking status: Former Smoker    Smokeless tobacco: Never Used    Tobacco comment: "I smoked in college, long ago  ; never a smoker & no passive smoke exposure as per [employer]   Substance and Sexual Activity    Alcohol use: Yes     Comment: "rarely"    Drug use: No    Sexual activity: Not on file   Lifestyle    Physical activity     Days per week: Not on file     Minutes per session: Not on file    Stress: Not on file   Relationships    Social connections     Talks on phone: Not on file     Gets together: Not on file     Attends Alevism service: Not on file     Active member of club or organization: Not on file     Attends meetings of clubs or organizations: Not on file     Relationship status: Not on file    Intimate partner violence     Fear of current or ex partner: Not on file Emotionally abused: Not on file     Physically abused: Not on file     Forced sexual activity: Not on file   Other Topics Concern    Not on file   Social History Narrative    Not on file         I have reviewed the past medical, surgical, social and family history, medications and allergies as documented in the EMR  Review of systems: ROS is negative other than that noted in the HPI  Constitutional: Negative for fatigue and fever  HENT: Negative for sore throat  Respiratory: Negative for shortness of breath  Cardiovascular: Negative for chest pain  Gastrointestinal: Negative for abdominal pain  Endocrine: Negative for cold intolerance and heat intolerance  Genitourinary: Negative for flank pain  Musculoskeletal: Negative for back pain  Skin: Negative for rash  Allergic/Immunologic: Negative for immunocompromised state  Neurological: Negative for dizziness  Psychiatric/Behavioral: Negative for agitation  Physical Exam:    Blood pressure 137/75, pulse 74, temperature 98 5 °F (36 9 °C), height 5' 4" (1 626 m), weight 67 1 kg (148 lb)  General/Constitutional: NAD, well developed, well nourished  HENT: Normocephalic, atraumatic  CV: Intact distal pulses, regular rate  Resp: No respiratory distress or labored breathing  Lymphatic: No lymphadenopathy palpated  Neuro: Alert and Oriented x 3, no focal deficits  Psych: Normal mood, normal affect, normal judgement, normal behavior  Skin: Warm, dry, no rashes, no erythema      Knee Exam (focused): RIGHT LEFT   ROM:   0-130 0-130   Palpation: Effusion negative negative     MJL tenderness Negative Negative     LJL tenderness Negative Negative   Meniscus:  Alejandra Negative Negative    Apley's Compression Negative Negative   Instability: Varus stable stable     Valgus stable stable   Special Tests: Lachman Negative Negative     Posterior drawer Negative Negative     Anterior drawer Negative Negative     Pivot shift not tested not tested     Dial not tested not tested   Patella: Palpation medial facet ttp and no tenderness no tenderness     Mobility    1/4     Apprehension Negative Negative   Other: Single leg 1/4 squat not tested not tested      LE NV Exam: +2 DP/PT pulses bilaterally  Sensation intact to light touch L2-S1 bilaterally     Bilateral hip ROM demonstrates no pain actively or passively    No calf tenderness to palpation bilaterally    Knee Imaging    X-rays of the right knee were reviewed, which demonstrate severe patellofemoral space OA  I have reviewed the radiology report and agree with their impression        Scribe Attestation    I,:    am acting as a scribe while in the presence of the attending physician :        I,:    personally performed the services described in this documentation    as scribed in my presence :

## 2020-11-17 DIAGNOSIS — E78.5 HYPERLIPIDEMIA, UNSPECIFIED HYPERLIPIDEMIA TYPE: ICD-10-CM

## 2020-11-18 RX ORDER — ATORVASTATIN CALCIUM 10 MG/1
TABLET, FILM COATED ORAL
Qty: 30 TABLET | Refills: 3 | Status: SHIPPED | OUTPATIENT
Start: 2020-11-18 | End: 2020-11-19

## 2020-11-19 DIAGNOSIS — E78.5 HYPERLIPIDEMIA, UNSPECIFIED HYPERLIPIDEMIA TYPE: ICD-10-CM

## 2020-11-19 RX ORDER — ATORVASTATIN CALCIUM 10 MG/1
TABLET, FILM COATED ORAL
Qty: 30 TABLET | Refills: 3 | Status: SHIPPED | OUTPATIENT
Start: 2020-11-19 | End: 2022-04-05 | Stop reason: ALTCHOICE

## 2021-03-10 DIAGNOSIS — Z23 ENCOUNTER FOR IMMUNIZATION: ICD-10-CM

## 2021-03-11 ENCOUNTER — OFFICE VISIT (OUTPATIENT)
Dept: FAMILY MEDICINE CLINIC | Facility: CLINIC | Age: 66
End: 2021-03-11
Payer: COMMERCIAL

## 2021-03-11 VITALS
TEMPERATURE: 98.4 F | RESPIRATION RATE: 16 BRPM | HEART RATE: 94 BPM | SYSTOLIC BLOOD PRESSURE: 152 MMHG | DIASTOLIC BLOOD PRESSURE: 88 MMHG | BODY MASS INDEX: 24.92 KG/M2 | WEIGHT: 146 LBS | HEIGHT: 64 IN | OXYGEN SATURATION: 98 %

## 2021-03-11 DIAGNOSIS — Z12.31 SCREENING MAMMOGRAM, ENCOUNTER FOR: Primary | ICD-10-CM

## 2021-03-11 DIAGNOSIS — I10 ESSENTIAL HYPERTENSION: ICD-10-CM

## 2021-03-11 DIAGNOSIS — J06.9 ACUTE UPPER RESPIRATORY INFECTION: ICD-10-CM

## 2021-03-11 PROCEDURE — 99214 OFFICE O/P EST MOD 30 MIN: CPT | Performed by: PHYSICIAN ASSISTANT

## 2021-03-11 PROCEDURE — 3008F BODY MASS INDEX DOCD: CPT | Performed by: PHYSICIAN ASSISTANT

## 2021-03-11 PROCEDURE — 1036F TOBACCO NON-USER: CPT | Performed by: PHYSICIAN ASSISTANT

## 2021-03-11 PROCEDURE — 3077F SYST BP >= 140 MM HG: CPT | Performed by: PHYSICIAN ASSISTANT

## 2021-03-11 PROCEDURE — 1160F RVW MEDS BY RX/DR IN RCRD: CPT | Performed by: PHYSICIAN ASSISTANT

## 2021-03-11 PROCEDURE — 99051 MED SERV EVE/WKEND/HOLIDAY: CPT | Performed by: PHYSICIAN ASSISTANT

## 2021-03-11 PROCEDURE — 3079F DIAST BP 80-89 MM HG: CPT | Performed by: PHYSICIAN ASSISTANT

## 2021-03-11 PROCEDURE — 3725F SCREEN DEPRESSION PERFORMED: CPT | Performed by: PHYSICIAN ASSISTANT

## 2021-03-11 RX ORDER — AZITHROMYCIN 250 MG/1
TABLET, FILM COATED ORAL
Qty: 6 TABLET | Refills: 0 | Status: SHIPPED | OUTPATIENT
Start: 2021-03-11 | End: 2021-03-16

## 2021-03-11 NOTE — PROGRESS NOTES
Assessment/Plan:      -I did advise her that her symptoms appear to be more viral but she would like the Zithromax  I did prescribe the medication  I did discuss the possibility of developing a resistance to the medication if over utilized  She states that she is aware of this  -increase clear liquids   - I did recommend she increase her Cozaar 100 mg from 1/2 tablet daily up to 1 tablet daily   - I did recommend she follow-up with Dr Shankar Friend in about 2 weeks rather than June because of her blood pressure being elevated  She will continue to monitor her blood pressure at home in the meantime     -Patient did sign for Cologuard   -mammogram ordered    BMI Counseling: Body mass index is 25 06 kg/m²  The BMI is above normal  Nutrition recommendations include reducing portion sizes and decreasing overall calorie intake  Exercise recommendations include exercising 3-5 times per week  [employer] software was used to dictate this note  It may contain errors with dictating incorrect words/spelling  Please contact provider directly for any questions  Diagnoses and all orders for this visit:    Screening mammogram, encounter for  -     Cancel: Mammo screening bilateral w cad; Future  -     Mammo screening bilateral w cad; Future    Acute upper respiratory infection  -     azithromycin (ZITHROMAX) 250 mg tablet; TAKE 2 TABLETS ON DAY 1 THEN 1 TABLETS DAY 2-5    Essential hypertension    BMI 25 0-25 9,adult          Subjective:      Patient ID: Jerman Dominguez is a 76 y o  female  Patient presents today for acute visit for evaluation of nasal congestion, hoarse voice, postnasal drip, intermittent right ear pain over the past 2-3 days  She denies any fever, chills, cough  She has not tried any medications over-the-counter  She states that she feels as though she needs an antibiotic    She states that she has tried a nasal spray / Flonase in the past for similar symptoms but she does not like utilizing nasal sprays  She does not monitor her blood pressure at home on a regular basis  She states the last time was about a month ago and was around 130-140/80  She is currently only taking a half of the Cozaar 100 mg daily  The following portions of the patient's history were reviewed and updated as appropriate: past family history, past medical history, past social history and past surgical history  Review of Systems   Constitutional: Negative for chills and fever  HENT: Positive for congestion, ear pain, postnasal drip, sinus pressure, sinus pain and sore throat  Negative for ear discharge  Respiratory: Negative for cough, shortness of breath and wheezing  Objective:      /88 (BP Location: Left arm, Patient Position: Sitting, Cuff Size: Standard) Comment: initial blood pressure 162/88  Pulse 94   Temp 98 4 °F (36 9 °C) (Tympanic)   Resp 16   Ht 5' 4" (1 626 m)   Wt 66 2 kg (146 lb)   SpO2 98%   BMI 25 06 kg/m²          Physical Exam  Constitutional:       General: She is not in acute distress  Appearance: Normal appearance  She is not ill-appearing, toxic-appearing or diaphoretic  HENT:      Head: Normocephalic and atraumatic  Right Ear: Tympanic membrane, ear canal and external ear normal  There is no impacted cerumen  Left Ear: Tympanic membrane, ear canal and external ear normal    Neck:      Musculoskeletal: Neck supple  Cardiovascular:      Rate and Rhythm: Normal rate and regular rhythm  Heart sounds: No murmur  Pulmonary:      Effort: Pulmonary effort is normal  No respiratory distress  Breath sounds: No wheezing, rhonchi or rales  Skin:     General: Skin is warm  Neurological:      General: No focal deficit present  Mental Status: She is alert     Psychiatric:         Mood and Affect: Mood normal

## 2021-04-25 DIAGNOSIS — I10 ESSENTIAL HYPERTENSION: ICD-10-CM

## 2021-04-26 RX ORDER — LOSARTAN POTASSIUM 100 MG/1
TABLET ORAL
Qty: 90 TABLET | Refills: 1 | Status: SHIPPED | OUTPATIENT
Start: 2021-04-26 | End: 2022-01-03

## 2021-05-05 ENCOUNTER — VBI (OUTPATIENT)
Dept: ADMINISTRATIVE | Facility: OTHER | Age: 66
End: 2021-05-05

## 2021-06-15 ENCOUNTER — VBI (OUTPATIENT)
Dept: ADMINISTRATIVE | Facility: OTHER | Age: 66
End: 2021-06-15

## 2021-08-25 ENCOUNTER — VBI (OUTPATIENT)
Dept: ADMINISTRATIVE | Facility: OTHER | Age: 66
End: 2021-08-25

## 2021-09-16 ENCOUNTER — TELEPHONE (OUTPATIENT)
Dept: FAMILY MEDICINE CLINIC | Facility: CLINIC | Age: 66
End: 2021-09-16

## 2021-10-28 ENCOUNTER — VBI (OUTPATIENT)
Dept: ADMINISTRATIVE | Facility: OTHER | Age: 66
End: 2021-10-28

## 2021-11-02 ENCOUNTER — VBI (OUTPATIENT)
Dept: ADMINISTRATIVE | Facility: OTHER | Age: 66
End: 2021-11-02

## 2021-12-29 ENCOUNTER — TELEPHONE (OUTPATIENT)
Dept: FAMILY MEDICINE CLINIC | Facility: CLINIC | Age: 66
End: 2021-12-29

## 2021-12-29 DIAGNOSIS — R05.9 COUGH: Primary | ICD-10-CM

## 2021-12-29 PROCEDURE — 87636 SARSCOV2 & INF A&B AMP PRB: CPT | Performed by: FAMILY MEDICINE

## 2022-01-02 DIAGNOSIS — I10 ESSENTIAL HYPERTENSION: ICD-10-CM

## 2022-01-03 RX ORDER — LOSARTAN POTASSIUM 100 MG/1
TABLET ORAL
Qty: 30 TABLET | Refills: 0 | Status: SHIPPED | OUTPATIENT
Start: 2022-01-03 | End: 2022-01-31

## 2022-01-04 ENCOUNTER — TELEPHONE (OUTPATIENT)
Dept: FAMILY MEDICINE CLINIC | Facility: CLINIC | Age: 67
End: 2022-01-04

## 2022-01-04 NOTE — TELEPHONE ENCOUNTER
----- Message from Dawson Santillan MD sent at 1/3/2022  5:00 PM EST -----  COVID test came back positive

## 2022-01-10 ENCOUNTER — CLINICAL SUPPORT (OUTPATIENT)
Dept: URGENT CARE | Facility: MEDICAL CENTER | Age: 67
End: 2022-01-10
Payer: COMMERCIAL

## 2022-01-10 ENCOUNTER — APPOINTMENT (OUTPATIENT)
Dept: LAB | Facility: MEDICAL CENTER | Age: 67
End: 2022-01-10
Payer: COMMERCIAL

## 2022-01-10 DIAGNOSIS — Z01.818 OTHER SPECIFIED PRE-OPERATIVE EXAMINATION: ICD-10-CM

## 2022-01-10 DIAGNOSIS — C44.320 SQUAMOUS CELL CARCINOMA, FACE: ICD-10-CM

## 2022-01-10 DIAGNOSIS — Z01.812 PRE-OPERATIVE LABORATORY EXAMINATION: ICD-10-CM

## 2022-01-10 DIAGNOSIS — Z01.810 PRE-OPERATIVE CARDIOVASCULAR EXAMINATION: ICD-10-CM

## 2022-01-10 LAB
ANION GAP SERPL CALCULATED.3IONS-SCNC: 5 MMOL/L (ref 4–13)
ATRIAL RATE: 72 BPM
BUN SERPL-MCNC: 25 MG/DL (ref 5–25)
CALCIUM SERPL-MCNC: 9.4 MG/DL (ref 8.3–10.1)
CHLORIDE SERPL-SCNC: 105 MMOL/L (ref 100–108)
CO2 SERPL-SCNC: 29 MMOL/L (ref 21–32)
CREAT SERPL-MCNC: 0.72 MG/DL (ref 0.6–1.3)
ERYTHROCYTE [DISTWIDTH] IN BLOOD BY AUTOMATED COUNT: 14 % (ref 11.6–15.1)
GFR SERPL CREATININE-BSD FRML MDRD: 86 ML/MIN/1.73SQ M
GLUCOSE SERPL-MCNC: 94 MG/DL (ref 65–140)
HCT VFR BLD AUTO: 36.1 % (ref 34.8–46.1)
HGB BLD-MCNC: 12.5 G/DL (ref 11.5–15.4)
MCH RBC QN AUTO: 33.1 PG (ref 26.8–34.3)
MCHC RBC AUTO-ENTMCNC: 34.6 G/DL (ref 31.4–37.4)
MCV RBC AUTO: 96 FL (ref 82–98)
P AXIS: 81 DEGREES
PLATELET # BLD AUTO: 352 THOUSANDS/UL (ref 149–390)
PMV BLD AUTO: 9.7 FL (ref 8.9–12.7)
POTASSIUM SERPL-SCNC: 4 MMOL/L (ref 3.5–5.3)
PR INTERVAL: 150 MS
QRS AXIS: 67 DEGREES
QRSD INTERVAL: 96 MS
QT INTERVAL: 390 MS
QTC INTERVAL: 427 MS
RBC # BLD AUTO: 3.78 MILLION/UL (ref 3.81–5.12)
SODIUM SERPL-SCNC: 139 MMOL/L (ref 136–145)
T WAVE AXIS: 70 DEGREES
VENTRICULAR RATE: 72 BPM
WBC # BLD AUTO: 6.85 THOUSAND/UL (ref 4.31–10.16)

## 2022-01-10 PROCEDURE — 36415 COLL VENOUS BLD VENIPUNCTURE: CPT

## 2022-01-10 PROCEDURE — 85027 COMPLETE CBC AUTOMATED: CPT

## 2022-01-10 PROCEDURE — 93005 ELECTROCARDIOGRAM TRACING: CPT

## 2022-01-10 PROCEDURE — 93010 ELECTROCARDIOGRAM REPORT: CPT | Performed by: INTERNAL MEDICINE

## 2022-01-10 PROCEDURE — 80048 BASIC METABOLIC PNL TOTAL CA: CPT

## 2022-01-17 ENCOUNTER — ANESTHESIA EVENT (OUTPATIENT)
Dept: PERIOP | Facility: HOSPITAL | Age: 67
End: 2022-01-17
Payer: COMMERCIAL

## 2022-01-17 RX ORDER — HYDROCODONE BITARTRATE AND ACETAMINOPHEN 5; 325 MG/1; MG/1
TABLET ORAL
COMMUNITY
Start: 2022-01-13 | End: 2022-04-05 | Stop reason: ALTCHOICE

## 2022-01-17 RX ORDER — UBIDECARENONE 50 MG
50 CAPSULE ORAL DAILY
COMMUNITY
Start: 2022-01-12 | End: 2022-04-05 | Stop reason: ALTCHOICE

## 2022-01-17 RX ORDER — CEPHALEXIN 500 MG/1
CAPSULE ORAL
COMMUNITY
Start: 2022-01-13 | End: 2022-04-05 | Stop reason: ALTCHOICE

## 2022-01-17 NOTE — PRE-PROCEDURE INSTRUCTIONS
Pre-Surgery Instructions:   Medication Instructions    atorvastatin (LIPITOR) 10 mg tablet Instructed patient per Anesthesia Guidelines   losartan (COZAAR) 100 MG tablet Instructed patient per Anesthesia Guidelines  Patient may take*tylenol if needed with a sip of water the morning of surgery  Patient / instructed on use of Dial antibac soap per hospital protocol    Patient instructed to stop all ASA, NSAIDS(at least 3 days), vitamins and herbal supplements from now to surgery or per Dr Annemarie Long

## 2022-01-21 ENCOUNTER — HOSPITAL ENCOUNTER (OUTPATIENT)
Facility: HOSPITAL | Age: 67
Setting detail: OUTPATIENT SURGERY
Discharge: HOME | End: 2022-01-21
Attending: PLASTIC SURGERY | Admitting: PLASTIC SURGERY
Payer: COMMERCIAL

## 2022-01-21 ENCOUNTER — ANESTHESIA (OUTPATIENT)
Dept: PERIOP | Facility: HOSPITAL | Age: 67
End: 2022-01-21
Payer: COMMERCIAL

## 2022-01-21 VITALS
TEMPERATURE: 97.9 F | WEIGHT: 142.42 LBS | OXYGEN SATURATION: 95 % | SYSTOLIC BLOOD PRESSURE: 123 MMHG | RESPIRATION RATE: 20 BRPM | DIASTOLIC BLOOD PRESSURE: 63 MMHG | HEIGHT: 63 IN | HEART RATE: 84 BPM | BODY MASS INDEX: 25.23 KG/M2

## 2022-01-21 DIAGNOSIS — C44.329 SQUAMOUS CELL CANCER OF SKIN OF EYEBROW: ICD-10-CM

## 2022-01-21 DIAGNOSIS — C44.229 SQUAMOUS CELL CARCINOMA OF SKIN OF LEFT EAR AND EXTERNAL AURICULAR CANAL: ICD-10-CM

## 2022-01-21 PROCEDURE — 88305 TISSUE EXAM BY PATHOLOGIST: CPT | Performed by: PATHOLOGY

## 2022-01-21 RX ORDER — EPHEDRINE SULFATE 50 MG/ML
INJECTION INTRAVENOUS AS NEEDED
Status: DISCONTINUED | OUTPATIENT
Start: 2022-01-21 | End: 2022-01-21

## 2022-01-21 RX ORDER — HYDROCODONE BITARTRATE AND ACETAMINOPHEN 5; 325 MG/1; MG/1
2 TABLET ORAL EVERY 4 HOURS PRN
Status: DISCONTINUED | OUTPATIENT
Start: 2022-01-21 | End: 2022-01-21 | Stop reason: HOSPADM

## 2022-01-21 RX ORDER — FENTANYL CITRATE 50 UG/ML
INJECTION, SOLUTION INTRAMUSCULAR; INTRAVENOUS AS NEEDED
Status: DISCONTINUED | OUTPATIENT
Start: 2022-01-21 | End: 2022-01-21

## 2022-01-21 RX ORDER — SODIUM CHLORIDE 9 MG/ML
125 INJECTION, SOLUTION INTRAVENOUS CONTINUOUS
Status: DISCONTINUED | OUTPATIENT
Start: 2022-01-21 | End: 2022-01-21 | Stop reason: HOSPADM

## 2022-01-21 RX ORDER — MAGNESIUM HYDROXIDE 1200 MG/15ML
LIQUID ORAL AS NEEDED
Status: DISCONTINUED | OUTPATIENT
Start: 2022-01-21 | End: 2022-01-21 | Stop reason: HOSPADM

## 2022-01-21 RX ORDER — FENTANYL CITRATE/PF 50 MCG/ML
25 SYRINGE (ML) INJECTION
Status: DISCONTINUED | OUTPATIENT
Start: 2022-01-21 | End: 2022-01-21 | Stop reason: HOSPADM

## 2022-01-21 RX ORDER — DEXAMETHASONE SODIUM PHOSPHATE 4 MG/ML
INJECTION, SOLUTION INTRA-ARTICULAR; INTRALESIONAL; INTRAMUSCULAR; INTRAVENOUS; SOFT TISSUE AS NEEDED
Status: DISCONTINUED | OUTPATIENT
Start: 2022-01-21 | End: 2022-01-21

## 2022-01-21 RX ORDER — LIDOCAINE HYDROCHLORIDE 20 MG/ML
INJECTION, SOLUTION EPIDURAL; INFILTRATION; INTRACAUDAL; PERINEURAL AS NEEDED
Status: DISCONTINUED | OUTPATIENT
Start: 2022-01-21 | End: 2022-01-21

## 2022-01-21 RX ORDER — ONDANSETRON 2 MG/ML
4 INJECTION INTRAMUSCULAR; INTRAVENOUS EVERY 8 HOURS PRN
Status: DISCONTINUED | OUTPATIENT
Start: 2022-01-21 | End: 2022-01-21 | Stop reason: HOSPADM

## 2022-01-21 RX ORDER — GINSENG 100 MG
CAPSULE ORAL AS NEEDED
Status: DISCONTINUED | OUTPATIENT
Start: 2022-01-21 | End: 2022-01-21 | Stop reason: HOSPADM

## 2022-01-21 RX ORDER — PROPOFOL 10 MG/ML
INJECTION, EMULSION INTRAVENOUS CONTINUOUS PRN
Status: DISCONTINUED | OUTPATIENT
Start: 2022-01-21 | End: 2022-01-21

## 2022-01-21 RX ORDER — ONDANSETRON 2 MG/ML
4 INJECTION INTRAMUSCULAR; INTRAVENOUS ONCE AS NEEDED
Status: DISCONTINUED | OUTPATIENT
Start: 2022-01-21 | End: 2022-01-21 | Stop reason: HOSPADM

## 2022-01-21 RX ORDER — CEFAZOLIN SODIUM 1 G/50ML
1000 SOLUTION INTRAVENOUS
Status: COMPLETED | OUTPATIENT
Start: 2022-01-21 | End: 2022-01-21

## 2022-01-21 RX ORDER — MINERAL OIL
OIL (ML) MISCELLANEOUS AS NEEDED
Status: DISCONTINUED | OUTPATIENT
Start: 2022-01-21 | End: 2022-01-21 | Stop reason: HOSPADM

## 2022-01-21 RX ORDER — MIDAZOLAM HYDROCHLORIDE 2 MG/2ML
INJECTION, SOLUTION INTRAMUSCULAR; INTRAVENOUS AS NEEDED
Status: DISCONTINUED | OUTPATIENT
Start: 2022-01-21 | End: 2022-01-21

## 2022-01-21 RX ORDER — PROPOFOL 10 MG/ML
INJECTION, EMULSION INTRAVENOUS AS NEEDED
Status: DISCONTINUED | OUTPATIENT
Start: 2022-01-21 | End: 2022-01-21

## 2022-01-21 RX ORDER — AMLODIPINE BESYLATE 2.5 MG/1
2.5 TABLET ORAL DAILY
COMMUNITY

## 2022-01-21 RX ORDER — LIDOCAINE HYDROCHLORIDE AND EPINEPHRINE 10; 10 MG/ML; UG/ML
INJECTION, SOLUTION INFILTRATION; PERINEURAL AS NEEDED
Status: DISCONTINUED | OUTPATIENT
Start: 2022-01-21 | End: 2022-01-21 | Stop reason: HOSPADM

## 2022-01-21 RX ADMIN — SODIUM CHLORIDE 125 ML/HR: 9 INJECTION, SOLUTION INTRAVENOUS at 07:12

## 2022-01-21 RX ADMIN — FENTANYL CITRATE 25 MCG: 50 INJECTION INTRAMUSCULAR; INTRAVENOUS at 08:45

## 2022-01-21 RX ADMIN — FENTANYL CITRATE 50 MCG: 50 INJECTION INTRAMUSCULAR; INTRAVENOUS at 08:34

## 2022-01-21 RX ADMIN — EPHEDRINE SULFATE 10 MG: 50 INJECTION, SOLUTION INTRAVENOUS at 08:55

## 2022-01-21 RX ADMIN — MIDAZOLAM 2 MG: 1 INJECTION INTRAMUSCULAR; INTRAVENOUS at 08:28

## 2022-01-21 RX ADMIN — PROPOFOL 130 MCG/KG/MIN: 10 INJECTION, EMULSION INTRAVENOUS at 08:34

## 2022-01-21 RX ADMIN — CEFAZOLIN SODIUM 1000 MG: 1 SOLUTION INTRAVENOUS at 08:15

## 2022-01-21 RX ADMIN — DEXAMETHASONE SODIUM PHOSPHATE 4 MG: 4 INJECTION INTRA-ARTICULAR; INTRALESIONAL; INTRAMUSCULAR; INTRAVENOUS; SOFT TISSUE at 08:44

## 2022-01-21 RX ADMIN — LIDOCAINE HYDROCHLORIDE 80 MG: 20 INJECTION, SOLUTION EPIDURAL; INFILTRATION; INTRACAUDAL; PERINEURAL at 08:34

## 2022-01-21 RX ADMIN — FENTANYL CITRATE 25 MCG: 50 INJECTION INTRAMUSCULAR; INTRAVENOUS at 09:07

## 2022-01-21 RX ADMIN — SODIUM CHLORIDE: 9 INJECTION, SOLUTION INTRAVENOUS at 09:32

## 2022-01-21 RX ADMIN — PROPOFOL 150 MG: 10 INJECTION, EMULSION INTRAVENOUS at 08:34

## 2022-01-21 NOTE — DISCHARGE INSTRUCTIONS
[address], [address] [facility], [address] [facility], \[facility]\"", [address] [facility] /W / [employer]  [facility]       Avoid direct sunlight    No ice or heating pack    Ok to shower,but use a shower cap to cover the left ear  The ear dressing must stay dry   The other incisions can get wet but avoid direct water pressure on them    No exercise or outdoor activities    No strenuous activity    Apply antibiotic ointment (bacitracin or neosporin) to the right eyebrow and left facial incision three times per day        Call [phone] for an appointment in 7-14 days

## 2022-01-21 NOTE — DISCHARGE SUMMARY
Discharge Summary - Frankie Smith 78 y o  female MRN: [id number]    [facility] PACU Room / Bed: OR POOL/OR POOL Encounter: [id number]    BRIEF OVERVIEW  Admitting Provider: Adrienne Turner MD  Discharge Provider: Adrienne Turner MD  Primary Care Physician at Discharge: Barb Lofton [phone]    Discharge To: Home      Admission Date: 1/21/2022     Discharge Date: No discharge date for patient encounter  Code Status: No Order  Advance Directive and Living Will: <no information>  Power of :        Primary Discharge Diagnosis  Active Problems:    * No active hospital problems  *  Resolved Problems:    * No resolved hospital problems   *        Discharge Disposition: Final discharge disposition not confirmed            [address] [place] [address]    Presenting Problem/History of Present Illness  <principal problem not specified>      Discharge Condition: stable    Patient tolerated the procedure well, recovered and was discharged home in stable condition    Adrienne Turner MD  1/21/2022  10:03 AM

## 2022-01-21 NOTE — OP NOTE
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]    Procedure(s) (LRB):  EXCISION OF LEFT EAR & RIGHT EYEBROW SKIN CANCER W/LOCAL FLAP (Bilateral)  SKIN GRAFT (Left)    Specimen(s):  ID Type Source Tests Collected by Time Destination   1 : Right Eyebrow Carcinoma-Short Superior Long Lateral Double Inferior Tissue Lesion TISSUE EXAM Mamta Patel MD 2022 0855    2 : Left Ear Carcinoma-Short Superior Long Lateral Double Inferior Tissue Lesion TISSUE EXAM Mamta Patel MD 2022 0907        Estimated Blood Loss:   Minimal    Drains:  * No LDAs found *    Anesthesia Type:   IV Sedation with Anesthesia    Operative Indications:  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]  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]      Operative Findings:      Complications:   None    Procedure and Technique:  The patient was brought to the operating room and placed supine on the operating room table  Time-out procedure was performed SCDs were applied and IV antibiotics were given  After adequate anesthesia was obtained the face was prepped and draped using standard surgical technique  Attention was turned to the right upper eyelid skin cancer  3 mm margins were designed around the lesion  This pattern was amputated in a subcutaneous plane marked for orientation and sent for final pathology  Lesions plus margins was 1 2 cm    A local flap was designed in order to avoid distortion of the eyebrow  Incisions were carried laterally and inferiorly  This inferior skin was elevated in subcutaneous plane and was rotated and advanced superiorly and medially for flap plus defect of 4 centimeter squared  The flap was inset using 6 0 Vicryl suture in interrupted deep dermal technique followed by 6 0 Prolene suture in interrupted technique  Bacitracin ointment was applied  Attention was turned to left ear skin cancer  3 mm margins were designed around the lesion  This pattern was amputated subcutaneous plane marked for orientation sent for final pathology  The lesion plus margins was 1 3 cm  A full-thickness skin graft was designed to minimize distortion of the earlobe  A template of the defect was transposed to the left preauricular area  This was then incorporated into an ellipse  This pattern was elevated as a full-thickness skin graft  The graft was cut to the shape of the defect and inset using 4-0 nylon suture  This was then covered with Xeroform and cotton balls and secured as a tie-over bolster providing excellent stability of the graft  The donor site was closed using 4-0 Vicryl suture in interrupted deep dermal technique followed by 4-0 strata fix suture in running subcuticular technique  Bacitracin was applied     I was present for the entire procedure and A qualified resident physician was not available    Patient Disposition:  hemodynamically stable and extubated and stable      SIGNATURE: Omid Carmen MD  DATE: January 21, 2022  TIME: 9:57 AM

## 2022-01-21 NOTE — ANESTHESIA PREPROCEDURE EVALUATION
Procedure:  EXCISION OF LEFT EAR & RIGHT EYEBROW SKIN CANCER W/LOCAL FLAP (Bilateral Face)  SKIN GRAFT (N/A Face)    Relevant Problems   ANESTHESIA (within normal limits)      CARDIO  2022    Left Ventricle: Left ventricle is normal in size  Wall thickness is   normal  Systolic function is normal with an ejection fraction of 60-65%  Left atrial pressure is normal      Left Atrium: Left atrium cavity size is normal      Right Ventricle: Right ventricle cavity is normal  Systolic function is   normal      IVC/SVC: The inferior vena cava demonstrates a diameter of <=21 mm and   collapses >50%; therefore, the right atrial pressure is estimated at 0-5   mmHg    Pericardium: There is no pericardial effusion    Pulmonic Valve: The estimated pulmonary artery systolic pressure is   74 7 mmHg       (+) Hyperlipidemia   (+) Hypertension      ENDO (within normal limits)      GI/HEPATIC (within normal limits)      /RENAL (within normal limits)      HEMATOLOGY (within normal limits)      MUSCULOSKELETAL   (+) Chronic back pain   (+) Osteoarthritis      NEURO/PSYCH   (+) Anxiety   (+) Chronic back pain   (+) Headache      PULMONARY   (+) Acute upper respiratory infection        Physical Exam    Airway    Mallampati score: II  TM Distance: >3 FB  Neck ROM: full     Dental   No notable dental hx     Cardiovascular  Rhythm: regular, Rate: normal, Cardiovascular exam normal    Pulmonary  Pulmonary exam normal Breath sounds clear to auscultation,     Other Findings        Anesthesia Plan  ASA Score- 2     Anesthesia Type- general and IV sedation with anesthesia with ASA Monitors  Additional Monitors:   Airway Plan: LMA  Plan Factors-    Chart reviewed  Existing labs reviewed  Patient summary reviewed  Patient is not a current smoker  Induction- intravenous  Postoperative Plan-     Informed Consent- Anesthetic plan and risks discussed with patient

## 2022-01-31 DIAGNOSIS — I10 ESSENTIAL HYPERTENSION: ICD-10-CM

## 2022-01-31 RX ORDER — LOSARTAN POTASSIUM 100 MG/1
TABLET ORAL
Qty: 30 TABLET | Refills: 0 | Status: SHIPPED | OUTPATIENT
Start: 2022-01-31 | End: 2022-04-01

## 2022-01-31 NOTE — TELEPHONE ENCOUNTER
Pt last seen 3/2021 refilled for 30 days only and messaged her through her my chart that a visit will be needed for further refills

## 2022-02-05 ENCOUNTER — OFFICE VISIT (OUTPATIENT)
Dept: URGENT CARE | Age: 67
End: 2022-02-05
Payer: COMMERCIAL

## 2022-02-05 VITALS
WEIGHT: 142 LBS | SYSTOLIC BLOOD PRESSURE: 158 MMHG | HEART RATE: 88 BPM | RESPIRATION RATE: 18 BRPM | BODY MASS INDEX: 25.16 KG/M2 | OXYGEN SATURATION: 98 % | HEIGHT: 63 IN | DIASTOLIC BLOOD PRESSURE: 74 MMHG | TEMPERATURE: 97.8 F

## 2022-02-05 DIAGNOSIS — M65.4 DE QUERVAIN'S TENOSYNOVITIS, LEFT: Primary | ICD-10-CM

## 2022-02-05 PROCEDURE — 99213 OFFICE O/P EST LOW 20 MIN: CPT | Performed by: PHYSICIAN ASSISTANT

## 2022-02-05 NOTE — PROGRESS NOTES
[facility]        NAME: Issa Oconnor is a 78 y o  female  : 1955    MRN: [id number]  DATE: 2022  TIME: 10:48 AM    Assessment and Plan   De Quervain's tenosynovitis, left [M65 4]  1  De Quervain's tenosynovitis, left  Ambulatory Referral to Occupational Therapy         Patient Instructions     Advil as directed every 6-8 hours  Tylenol as needed for additional pain control  Use wrist brace as directed  Follow-up with occupational therapy  Follow up with PCP in 3-5 days  Proceed to  ER if symptoms worsen  Chief Complaint     Chief Complaint   Patient presents with    Wrist Pain     Left wrist pain x 4 weeks         History of Present Illness       14-year-old female presents with left wrist pain  Patient says the pain has been waxing waning for the past 4 weeks  No traumas reported to the area  Wrist Pain   The pain is present in the left wrist  This is a new problem  The current episode started 1 to 4 weeks ago  There has been no history of extremity trauma  The problem occurs constantly  The problem has been waxing and waning  The quality of the pain is described as aching  The pain is moderate  Associated symptoms include joint swelling and a limited range of motion  Pertinent negatives include no numbness or stiffness  The symptoms are aggravated by activity  She has tried NSAIDS and cold for the symptoms  The treatment provided no relief  Review of Systems   Review of Systems   Constitutional: Negative  Respiratory: Negative  Cardiovascular: Negative  Gastrointestinal: Negative  Musculoskeletal: Negative  Negative for stiffness  Skin: Negative  Neurological: Negative  Negative for numbness           Current Medications       Current Outpatient Medications:     amLODIPine (NORVASC) 2 5 mg tablet, Take 2 5 mg by mouth daily, Disp: , Rfl:     atorvastatin (LIPITOR) 10 mg tablet, TAKE ONE TABLET BY MOUTH EVERY DAY (Patient taking differently: Take 5 mg by mouth every other day Every evening ), Disp: 30 tablet, Rfl: 3    Coenzyme Q10 50 MG CAPS, Take 50 mg by mouth daily Per pt to start taking after surgery per her cardiologist , Disp: , Rfl:     losartan (COZAAR) 100 MG tablet, TAKE ONE TABLET BY MOUTH EVERY DAY (Patient taking differently: 50 mg  ), Disp: 30 tablet, Rfl: 0    cephalexin (KEFLEX) 500 mg capsule, , Disp: , Rfl:     HYDROcodone-acetaminophen (NORCO) 5-325 mg per tablet, , Disp: , Rfl:     Current Allergies     Allergies as of 02/05/2022    (No Known Allergies)            The following portions of the patient's history were reviewed and updated as appropriate: allergies, current medications, past family history, past medical history, past social history, past surgical history and problem list      Past Medical History:   Diagnosis Date    Arthritis     Exercise involving walking     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[address], "very active" per pt    History of basal cell cancer     Hyperlipidemia     Hypertension     Sees Dr Delvalle Dani [facility] cardio/Echo 1/17    Osteoarthritis 6/5/2007    Skin cancer 07/16/2015    right thigh    Wears glasses        Past Surgical History:   Procedure Laterality Date    FLAP LOCAL HEAD / NECK Bilateral 1/21/2022    Procedure: EXCISION OF LEFT EAR & RIGHT EYEBROW SKIN CANCER W/LOCAL FLAP;  Surgeon: Ernesto Carbone MD;  Location: [facility];  Service: Plastics    WI FULL THICK 2011 [facility] NOS,EAR,LID <20 SQCM Left 1/21/2022    Procedure: SKIN GRAFT;  Surgeon: Ernesto Carbone MD;  Location: [facility];  Service: Plastics    SKIN BIOPSY      local to remove skin cancer    TONSILLECTOMY      WISDOM TOOTH EXTRACTION         Family History   Problem Relation Age of Onset    Hypertension Mother     Hypertension Father     Hypertension Sister     Hypertension Brother          Medications have been verified          Objective   /74   Pulse 88   Temp 97 8 °F (36 6 °C)   Resp 18   Ht 5' 3" (1 6 m) Wt 64 4 kg (142 lb)   SpO2 98%   BMI 25 15 kg/m²   No LMP recorded  Patient is postmenopausal        Physical Exam     Physical Exam  Vitals and nursing note reviewed  Constitutional:       General: She is not in acute distress  Appearance: She is well-developed  HENT:      Head: Normocephalic and atraumatic  Right Ear: External ear normal       Left Ear: External ear normal       Nose: Nose normal       Mouth/Throat:      Pharynx: No oropharyngeal exudate  Eyes:      General:         Right eye: No discharge  Left eye: No discharge  Conjunctiva/sclera: Conjunctivae normal    Pulmonary:      Effort: Pulmonary effort is normal  No respiratory distress  Musculoskeletal:      Left wrist: Swelling, tenderness and bony tenderness present  No deformity, effusion, lacerations, snuff box tenderness or crepitus  Normal range of motion  Normal pulse  Cervical back: Normal range of motion and neck supple  Comments: Positive Finkelstein test   Skin:     General: Skin is warm and dry  Neurological:      Mental Status: She is alert and oriented to person, place, and time               Pre fabricated thumb spica splint applied

## 2022-02-05 NOTE — PATIENT INSTRUCTIONS
Advil as directed every 6-8 hours  Tylenol as needed for additional pain control  Use wrist brace as directed  Follow-up with occupational therapy  Follow up with PCP in 3-5 days  Proceed to  ER if symptoms worsen  Dylan Truro Disease   WHAT YOU NEED TO KNOW:   Darcy Arroyo disease is inflammation of the tendons on the thumb side of your wrist  Tendons are thick strands of tissue that connect muscles to bones  DISCHARGE INSTRUCTIONS:   Splint or brace: These devices will help decrease pain, limit movement, and protect your wrist so that it can heal  Make sure your device is comfortable  If it is too tight, your fingers may feel numb or tingly  Do not push or lean on your device because it can break  Physical or occupational therapy:  You may need to see a physical or occupational therapist to teach you special exercises  These exercises help improve movement and decrease pain  They also help improve strength and decrease your risk for loss of function  Therapists will help you make changes to your daily activities to decrease stress and pressure on the tendons  Rest:  Rest your injured thumb or wrist  Avoid twisting, grasping, or gripping movements  Ask when you can return to your normal activities  Medicines:   · NSAIDs:  These medicines decrease swelling, pain, and fever  They are available without a doctor's order  Ask which medicine is right for you, and how much to take  Take as directed  NSAIDs can cause stomach bleeding or kidney problems if not taken correctly  · Take your medicine as directed  Contact your healthcare provider if you think your medicine is not helping or if you have side effects  Tell him of her if you are allergic to any medicine  Keep a list of the medicines, vitamins, and herbs you take  Include the amounts, and when and why you take them  Bring the list or the pill bottles to follow-up visits  Carry your medicine list with you in case of an emergency      Follow up with your doctor as directed:  Write down your questions so you remember to ask them during your visits  Contact your healthcare provider if:   · Your splint or brace is too tight and you cannot loosen it  · You have a fever  · Your pain and swelling get worse or do not go away  · Your cannot grasp objects because of the pain and swelling  · You have questions or concerns about your condition or care  Return to the emergency department if:   · You cannot move your thumb or wrist     · Your fingers feel numb, tingly, cool to the touch, or look blue or pale  © Copyright [employer] 2021 Information is for End User's use only and may not be sold, redistributed or otherwise used for commercial purposes  All illustrations and images included in CareNotes® are the copyrighted property of [employer]  or Yesenia Chun  The above information is an  only  It is not intended as medical advice for individual conditions or treatments  Talk to your doctor, nurse or pharmacist before following any medical regimen to see if it is safe and effective for you

## 2022-03-02 DIAGNOSIS — I10 ESSENTIAL HYPERTENSION: ICD-10-CM

## 2022-03-02 RX ORDER — LOSARTAN POTASSIUM 100 MG/1
TABLET ORAL
Qty: 30 TABLET | Refills: 0 | OUTPATIENT
Start: 2022-03-02

## 2022-03-02 NOTE — TELEPHONE ENCOUNTER
Pt last seen 3/11/21  Pt sched appr fpr PE on  4/5/22  Pt states she seen cardiology and he adjusted her BP meds  She is following with him for HTN, no need to refill losartan

## 2022-03-03 ENCOUNTER — APPOINTMENT (OUTPATIENT)
Dept: RADIOLOGY | Facility: CLINIC | Age: 67
End: 2022-03-03
Payer: COMMERCIAL

## 2022-03-03 ENCOUNTER — OFFICE VISIT (OUTPATIENT)
Dept: OBGYN CLINIC | Facility: CLINIC | Age: 67
End: 2022-03-03
Payer: COMMERCIAL

## 2022-03-03 VITALS
BODY MASS INDEX: 25.52 KG/M2 | SYSTOLIC BLOOD PRESSURE: 130 MMHG | DIASTOLIC BLOOD PRESSURE: 70 MMHG | WEIGHT: 144 LBS | HEIGHT: 63 IN

## 2022-03-03 DIAGNOSIS — M25.532 PAIN IN LEFT WRIST: ICD-10-CM

## 2022-03-03 DIAGNOSIS — M65.4 DE QUERVAIN'S TENOSYNOVITIS, LEFT: ICD-10-CM

## 2022-03-03 DIAGNOSIS — M25.532 PAIN IN LEFT WRIST: Primary | ICD-10-CM

## 2022-03-03 PROCEDURE — 3008F BODY MASS INDEX DOCD: CPT | Performed by: PHYSICIAN ASSISTANT

## 2022-03-03 PROCEDURE — 1036F TOBACCO NON-USER: CPT | Performed by: PHYSICIAN ASSISTANT

## 2022-03-03 PROCEDURE — 99214 OFFICE O/P EST MOD 30 MIN: CPT | Performed by: PHYSICIAN ASSISTANT

## 2022-03-03 PROCEDURE — 73110 X-RAY EXAM OF WRIST: CPT

## 2022-03-03 PROCEDURE — 1160F RVW MEDS BY RX/DR IN RCRD: CPT | Performed by: PHYSICIAN ASSISTANT

## 2022-03-03 RX ORDER — METHYLPREDNISOLONE 4 MG/1
TABLET ORAL
Qty: 21 TABLET | Refills: 0 | Status: SHIPPED | OUTPATIENT
Start: 2022-03-03 | End: 2022-04-05 | Stop reason: ALTCHOICE

## 2022-03-03 NOTE — PATIENT INSTRUCTIONS
Paco Ibarra Disease   WHAT YOU NEED TO KNOW:   Shakir Lanier disease is inflammation of the tendons on the thumb side of your wrist  Tendons are thick strands of tissue that connect muscles to bones  DISCHARGE INSTRUCTIONS:   Splint or brace: These devices will help decrease pain, limit movement, and protect your wrist so that it can heal  Make sure your device is comfortable  If it is too tight, your fingers may feel numb or tingly  Do not push or lean on your device because it can break  Physical or occupational therapy:  You may need to see a physical or occupational therapist to teach you special exercises  These exercises help improve movement and decrease pain  They also help improve strength and decrease your risk for loss of function  Therapists will help you make changes to your daily activities to decrease stress and pressure on the tendons  Rest:  Rest your injured thumb or wrist  Avoid twisting, grasping, or gripping movements  Ask when you can return to your normal activities  Medicines:   · NSAIDs:  These medicines decrease swelling, pain, and fever  They are available without a doctor's order  Ask which medicine is right for you, and how much to take  Take as directed  NSAIDs can cause stomach bleeding or kidney problems if not taken correctly  · Take your medicine as directed  Contact your healthcare provider if you think your medicine is not helping or if you have side effects  Tell him of her if you are allergic to any medicine  Keep a list of the medicines, vitamins, and herbs you take  Include the amounts, and when and why you take them  Bring the list or the pill bottles to follow-up visits  Carry your medicine list with you in case of an emergency  Follow up with your doctor as directed:  Write down your questions so you remember to ask them during your visits  Contact your healthcare provider if:   · Your splint or brace is too tight and you cannot loosen it      · You have a fever     · Your pain and swelling get worse or do not go away  · Your cannot grasp objects because of the pain and swelling  · You have questions or concerns about your condition or care  Return to the emergency department if:   · You cannot move your thumb or wrist     · Your fingers feel numb, tingly, cool to the touch, or look blue or pale  © Copyright [employer] 2022 Information is for End User's use only and may not be sold, redistributed or otherwise used for commercial purposes  All illustrations and images included in CareNotes® are the copyrighted property of [employer]  or [facility] Tony Chandra   The above information is an  only  It is not intended as medical advice for individual conditions or treatments  Talk to your doctor, nurse or pharmacist before following any medical regimen to see if it is safe and effective for you

## 2022-03-03 NOTE — PROGRESS NOTES
Orthopaedic Surgery - Office Note  Denis Peterson (70 y o  female)   : 1955   MRN: [id number]  Encounter Date: 3/3/2022    Chief Complaint   Patient presents with    Left Wrist - Pain         Assessment/Plan  Diagnoses and all orders for this visit:    Pain in left wrist  -     XR wrist 3+ vw left; Future  -     methylPREDNISolone 4 MG tablet therapy pack; Use as directed on package  -     Ambulatory Referral to Occupational Therapy; Future    De Quervain's tenosynovitis, left  -     methylPREDNISolone 4 MG tablet therapy pack; Use as directed on package  -     Ambulatory Referral to Occupational Therapy; Future    Diagnosis as well as treatment options were reviewed with the patient in the office today  Whom I advised her expect she should improve with the oral steroid icing occupational therapy and thumb spica brace  Appropriate education was provided regarding the oral steroid  Which she has taken safely in the remote past     Patient was advised if the conservative treatments do not relieve her symptoms she may consider a cortisone injection versus surgical release  She states she would like to try and avoid both those options if at all possible  Return with Dr Taco Dela Cruz at next available appt           History of Present Illness  patient is here for a new problem  Patient is having left wrist pain  Patient has a history of de Quervain's tenosynovitis on the left side in 2022 for which she sought care at the urgent care and was referred occupational therapy  She was given a thumb spica splint at that time as well  Patient expresses a desire to try and avoid an injection or surgery if at all possible  Patient has treated in the past with Dr Grecia Jennings for her knees  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  Review of Systems  Pertinent items are noted in HPI  All other systems were reviewed and are negative      Physical Exam  /70   Ht 5' 3" (1 6 m) Wt 65 3 kg (144 lb)   BMI 25 51 kg/m²   Cons: Appears well  No apparent distress  Psych: Alert  Oriented x3  Mood and affect normal   Eyes: PERRLA, EOMI  Resp: Normal effort  No audible wheezing or stridor  CV: Palpable pulse  No discernable arrhythmia  Lymph:  No palpable cervical, axillary, or inguinal lymphadenopathy  Skin: Warm  No palpable masses  No visible lesions  Neuro: Normal muscle tone  Normal and symmetric DTR's  Left wrist has no skin breakdown lesions or signs of infection  She is tender to palpation at the radial styloid and has a markedly positive Finkelstein's test   She has full range of motion in the MCP and IP joints  She has a negative Tinel's and Phalen's  There is no thenar atrophy  Patient has a slightly decreased  strength  Intrinsic strength is 5/5  No neurological deficits are appreciated  Patient's elbow exam is within normal limits      Studies Reviewed  X-rays performed in the office today three views of the left wrist show no acute fractures or dislocations  No bony abnormalities are noted  This was read from an orthopedic standpoint will await official radiologist interpretation    Procedures  No procedures today  Medical, Surgical, Family, and Social History  The patient's medical history, family history, and social history, were reviewed and updated as appropriate      Past Medical History:   Diagnosis Date    Arthritis     Exercise involving walking     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[address], "very active" per pt    History of basal cell cancer     Hyperlipidemia     Hypertension     Sees Dr Andre Alas [facility] cardio/Echo 1/17    Osteoarthritis 6/5/2007    Skin cancer 07/16/2015    right thigh    Wears glasses        Past Surgical History:   Procedure Laterality Date    FLAP LOCAL HEAD / NECK Bilateral 1/21/2022    Procedure: EXCISION OF LEFT EAR & RIGHT EYEBROW SKIN CANCER W/LOCAL FLAP;  Surgeon: Ana Paula Brambila MD;  Location: [facility];  Service: Plastics    IA FULL THICK 2011 [facility] [address] NOS,EAR,LID <20 SQCM Left 1/21/2022    Procedure: SKIN GRAFT;  Surgeon: Audra Stover MD;  Location: [facility];  Service: Plastics    SKIN BIOPSY      local to remove skin cancer    TONSILLECTOMY      WISDOM TOOTH EXTRACTION         Family History   Problem Relation Age of Onset    Hypertension Mother     Hypertension Father     Hypertension Sister     Hypertension Brother        Social History     Occupational History    Not on file   Tobacco Use    Smoking status: Former Smoker    Smokeless tobacco: Never Used    Tobacco comment: "I smoked in college, long ago  ; never a smoker & no passive smoke exposure as per [employer]   Vaping Use    Vaping Use: Never used   Substance and Sexual Activity    Alcohol use: Yes     Comment: "rarely"    Drug use: No    Sexual activity: Not on file     Comment: defer       No Known Allergies      Current Outpatient Medications:     amLODIPine (NORVASC) 2 5 mg tablet, Take 2 5 mg by mouth daily, Disp: , Rfl:     atorvastatin (LIPITOR) 10 mg tablet, TAKE ONE TABLET BY MOUTH EVERY DAY (Patient taking differently: Take 5 mg by mouth every other day Every evening ), Disp: 30 tablet, Rfl: 3    cephalexin (KEFLEX) 500 mg capsule, , Disp: , Rfl:     Coenzyme Q10 50 MG CAPS, Take 50 mg by mouth daily Per pt to start taking after surgery per her cardiologist , Disp: , Rfl:     HYDROcodone-acetaminophen (NORCO) 5-325 mg per tablet, , Disp: , Rfl:     losartan (COZAAR) 100 MG tablet, TAKE ONE TABLET BY MOUTH EVERY DAY (Patient taking differently: 50 mg  ), Disp: 30 tablet, Rfl: 0    methylPREDNISolone 4 MG tablet therapy pack, Use as directed on package, Disp: 21 tablet, Rfl: 0      Gunner Staples PA-C

## 2022-04-01 DIAGNOSIS — I10 ESSENTIAL HYPERTENSION: ICD-10-CM

## 2022-04-01 RX ORDER — LOSARTAN POTASSIUM 100 MG/1
50 TABLET ORAL DAILY
Qty: 30 TABLET | Refills: 0 | Status: SHIPPED | OUTPATIENT
Start: 2022-04-01 | End: 2022-04-05 | Stop reason: SDUPTHER

## 2022-04-04 ENCOUNTER — TELEPHONE (OUTPATIENT)
Dept: OBGYN CLINIC | Facility: HOSPITAL | Age: 67
End: 2022-04-04

## 2022-04-04 NOTE — TELEPHONE ENCOUNTER
SONALI Liriano  RE:  [id number]     Patient saw SONALI Liriano on 3/3  She was referred to [facility] but appt isn't till May 9th  Patient states she has been doing PT but her wrist is swollen and she is in pain  Asking to see Rajani Lala since she cannot get into [facility] earlier  SONALI Liriano - is this something you would f/u on?

## 2022-04-05 ENCOUNTER — OFFICE VISIT (OUTPATIENT)
Dept: FAMILY MEDICINE CLINIC | Facility: CLINIC | Age: 67
End: 2022-04-05
Payer: COMMERCIAL

## 2022-04-05 VITALS
RESPIRATION RATE: 16 BRPM | BODY MASS INDEX: 24.63 KG/M2 | DIASTOLIC BLOOD PRESSURE: 70 MMHG | SYSTOLIC BLOOD PRESSURE: 132 MMHG | HEART RATE: 77 BPM | HEIGHT: 63 IN | OXYGEN SATURATION: 99 % | WEIGHT: 139 LBS | TEMPERATURE: 98 F

## 2022-04-05 DIAGNOSIS — Z12.31 SCREENING MAMMOGRAM FOR BREAST CANCER: ICD-10-CM

## 2022-04-05 DIAGNOSIS — E78.2 MIXED HYPERLIPIDEMIA: ICD-10-CM

## 2022-04-05 DIAGNOSIS — Z12.12 ENCOUNTER FOR COLORECTAL CANCER SCREENING: ICD-10-CM

## 2022-04-05 DIAGNOSIS — Z12.11 ENCOUNTER FOR COLORECTAL CANCER SCREENING: ICD-10-CM

## 2022-04-05 DIAGNOSIS — I10 ESSENTIAL HYPERTENSION: ICD-10-CM

## 2022-04-05 DIAGNOSIS — Z78.0 ASYMPTOMATIC MENOPAUSE: ICD-10-CM

## 2022-04-05 DIAGNOSIS — M65.4 DE QUERVAIN'S TENOSYNOVITIS, LEFT: ICD-10-CM

## 2022-04-05 DIAGNOSIS — Z00.00 HEALTHCARE MAINTENANCE: Primary | ICD-10-CM

## 2022-04-05 PROBLEM — R94.31 NONSPECIFIC ABNORMAL ELECTROCARDIOGRAM (ECG): Status: ACTIVE | Noted: 2022-01-15

## 2022-04-05 PROBLEM — I51.7 LEFT VENTRICULAR HYPERTROPHY BY ELECTROCARDIOGRAM: Status: ACTIVE | Noted: 2022-01-15

## 2022-04-05 PROBLEM — E78.9 LIPID DISORDER: Status: ACTIVE | Noted: 2022-01-15

## 2022-04-05 PROCEDURE — 99397 PER PM REEVAL EST PAT 65+ YR: CPT | Performed by: FAMILY MEDICINE

## 2022-04-05 PROCEDURE — 1101F PT FALLS ASSESS-DOCD LE1/YR: CPT | Performed by: FAMILY MEDICINE

## 2022-04-05 PROCEDURE — 3288F FALL RISK ASSESSMENT DOCD: CPT | Performed by: FAMILY MEDICINE

## 2022-04-05 RX ORDER — LOSARTAN POTASSIUM 50 MG/1
50 TABLET ORAL DAILY
Qty: 90 TABLET | Refills: 1
Start: 2022-04-05 | End: 2022-07-24 | Stop reason: SDUPTHER

## 2022-04-05 NOTE — ASSESSMENT & PLAN NOTE
Discussed with patient to take NSAID, apply ice and immobilize  She is to follow-up with hand surgeon

## 2022-04-05 NOTE — PROGRESS NOTES
Assessment/Plan:  Essential hypertension  Well controlled  Continue same  Will continue to monitor  Healthcare maintenance  Was discussed about immunizations, nutrition safety measures  She was given referral for colonoscopy an order for mammogram and DEXA scan  Discussed about pneumonia shot and shingles shot  De Quervain's tenosynovitis, left  Discussed with patient to take NSAID, apply ice and immobilize  She is to follow-up with hand surgeon  Diagnoses and all orders for this visit:    Healthcare maintenance    Mixed hyperlipidemia  -     CBC and differential; Future  -     Comprehensive metabolic panel; Future  -     Lipid Panel with Direct LDL reflex; Future  -     TSH, 3rd generation with Free T4 reflex; Future    Essential hypertension  -     CBC and differential; Future  -     Comprehensive metabolic panel; Future  -     Lipid Panel with Direct LDL reflex; Future  -     TSH, 3rd generation with Free T4 reflex; Future  -     losartan (COZAAR) 50 mg tablet; Take 1 tablet (50 mg total) by mouth daily    Screening mammogram for breast cancer  -     Mammo screening bilateral w 3d & cad; Future    Encounter for colorectal cancer screening  -     Ambulatory Referral to General Surgery; Future    Asymptomatic menopause  -     DXA bone density spine hip and pelvis; Future    De Quervain's tenosynovitis, left          There are no Patient Instructions on file for this visit  Return in about 6 months (around 10/5/2022)  Subjective:      Patient ID: Sheila Robb is a 78 y o  female  Chief Complaint   Patient presents with    Physical Exam       She is here today for wellness exam   She has been taking her medications  She was seen Cardiology recently who adjusted her blood pressure medication  Blood pressure is better controlled  She is having skin cancer removal later today  She has been having problems with pain in her left wrist   She was seen care now on diagnosed with tendinitis  She has an appointment to see hand surgeon  The following portions of the patient's history were reviewed and updated as appropriate: allergies, current medications, past family history, past medical history, past social history, past surgical history and problem list     Review of Systems   Constitutional: Negative for chills and fever  HENT: Negative for trouble swallowing  Eyes: Negative for visual disturbance  Respiratory: Negative for cough and shortness of breath  Cardiovascular: Negative for chest pain, palpitations and leg swelling  Gastrointestinal: Negative for abdominal pain, constipation and diarrhea  Endocrine: Negative for cold intolerance and heat intolerance  Genitourinary: Negative for difficulty urinating and dysuria  Musculoskeletal: Negative for gait problem  Left wrist pain   Neurological: Negative for dizziness, tremors, seizures and headaches  Hematological: Negative for adenopathy  Psychiatric/Behavioral: Negative for behavioral problems  Current Outpatient Medications   Medication Sig Dispense Refill    amLODIPine (NORVASC) 2 5 mg tablet Take 2 5 mg by mouth daily      losartan (COZAAR) 50 mg tablet Take 1 tablet (50 mg total) by mouth daily 90 tablet 1     No current facility-administered medications for this visit  Objective:    /70 (BP Location: Left arm, Patient Position: Sitting, Cuff Size: Adult)   Pulse 77   Temp 98 °F (36 7 °C) (Tympanic)   Resp 16   Ht 5' 3" (1 6 m)   Wt 63 kg (139 lb)   SpO2 99%   BMI 24 62 kg/m²        Physical Exam  Vitals and nursing note reviewed  Constitutional:       Appearance: Normal appearance  HENT:      Head: Normocephalic and atraumatic  Mouth/Throat:      Pharynx: Oropharynx is clear  Eyes:      Conjunctiva/sclera: Conjunctivae normal    Pulmonary:      Effort: No respiratory distress     Musculoskeletal:         General: Tenderness (Tenderness to palpation left wrist with limited range of motion due to pain ) present  Cervical back: Normal range of motion  Right lower leg: No edema  Left lower leg: No edema  Neurological:      Mental Status: She is alert  Mental status is at baseline     Psychiatric:         Mood and Affect: Mood normal                 Dotty Miller MD

## 2022-04-05 NOTE — ASSESSMENT & PLAN NOTE
Was discussed about immunizations, nutrition safety measures  She was given referral for colonoscopy an order for mammogram and DEXA scan  Discussed about pneumonia shot and shingles shot

## 2022-04-06 NOTE — TELEPHONE ENCOUNTER
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 She is going to call the doctor who did the removal as well       # [phone]

## 2022-04-07 ENCOUNTER — OFFICE VISIT (OUTPATIENT)
Dept: OBGYN CLINIC | Facility: CLINIC | Age: 67
End: 2022-04-07
Payer: COMMERCIAL

## 2022-04-07 VITALS
WEIGHT: 137 LBS | DIASTOLIC BLOOD PRESSURE: 78 MMHG | HEIGHT: 63 IN | BODY MASS INDEX: 24.27 KG/M2 | SYSTOLIC BLOOD PRESSURE: 122 MMHG

## 2022-04-07 DIAGNOSIS — M65.4 DE QUERVAIN'S TENOSYNOVITIS, LEFT: ICD-10-CM

## 2022-04-07 DIAGNOSIS — M25.532 PAIN IN LEFT WRIST: Primary | ICD-10-CM

## 2022-04-07 PROCEDURE — 20605 DRAIN/INJ JOINT/BURSA W/O US: CPT | Performed by: PHYSICIAN ASSISTANT

## 2022-04-07 PROCEDURE — 3008F BODY MASS INDEX DOCD: CPT | Performed by: PHYSICIAN ASSISTANT

## 2022-04-07 PROCEDURE — 99213 OFFICE O/P EST LOW 20 MIN: CPT | Performed by: PHYSICIAN ASSISTANT

## 2022-04-07 PROCEDURE — 1160F RVW MEDS BY RX/DR IN RCRD: CPT | Performed by: PHYSICIAN ASSISTANT

## 2022-04-07 PROCEDURE — 1036F TOBACCO NON-USER: CPT | Performed by: PHYSICIAN ASSISTANT

## 2022-04-07 RX ORDER — LIDOCAINE HYDROCHLORIDE 10 MG/ML
0.5 INJECTION, SOLUTION INFILTRATION; PERINEURAL
Status: COMPLETED | OUTPATIENT
Start: 2022-04-07 | End: 2022-04-07

## 2022-04-07 RX ORDER — METHYLPREDNISOLONE ACETATE 80 MG/ML
0.5 INJECTION, SUSPENSION INTRA-ARTICULAR; INTRALESIONAL; INTRAMUSCULAR; SOFT TISSUE
Status: COMPLETED | OUTPATIENT
Start: 2022-04-07 | End: 2022-04-07

## 2022-04-07 RX ADMIN — LIDOCAINE HYDROCHLORIDE 0.5 ML: 10 INJECTION, SOLUTION INFILTRATION; PERINEURAL at 12:00

## 2022-04-07 RX ADMIN — METHYLPREDNISOLONE ACETATE 0.5 ML: 80 INJECTION, SUSPENSION INTRA-ARTICULAR; INTRALESIONAL; INTRAMUSCULAR; SOFT TISSUE at 12:00

## 2022-04-07 NOTE — PATIENT INSTRUCTIONS
Flavia Sandoval Disease   WHAT YOU NEED TO KNOW:   Tachoda [place] disease is inflammation of the tendons on the thumb side of your wrist  Tendons are thick strands of tissue that connect muscles to bones  DISCHARGE INSTRUCTIONS:   Splint or brace: These devices will help decrease pain, limit movement, and protect your wrist so that it can heal  Make sure your device is comfortable  If it is too tight, your fingers may feel numb or tingly  Do not push or lean on your device because it can break  Physical or occupational therapy:  You may need to see a physical or occupational therapist to teach you special exercises  These exercises help improve movement and decrease pain  They also help improve strength and decrease your risk for loss of function  Therapists will help you make changes to your daily activities to decrease stress and pressure on the tendons  Rest:  Rest your injured thumb or wrist  Avoid twisting, grasping, or gripping movements  Ask when you can return to your normal activities  Medicines:   · NSAIDs:  These medicines decrease swelling, pain, and fever  They are available without a doctor's order  Ask which medicine is right for you, and how much to take  Take as directed  NSAIDs can cause stomach bleeding or kidney problems if not taken correctly  · Take your medicine as directed  Contact your healthcare provider if you think your medicine is not helping or if you have side effects  Tell him of her if you are allergic to any medicine  Keep a list of the medicines, vitamins, and herbs you take  Include the amounts, and when and why you take them  Bring the list or the pill bottles to follow-up visits  Carry your medicine list with you in case of an emergency  Follow up with your doctor as directed:  Write down your questions so you remember to ask them during your visits  Contact your healthcare provider if:   · Your splint or brace is too tight and you cannot loosen it      · You have a fever     · Your pain and swelling get worse or do not go away  · Your cannot grasp objects because of the pain and swelling  · You have questions or concerns about your condition or care  Return to the emergency department if:   · You cannot move your thumb or wrist     · Your fingers feel numb, tingly, cool to the touch, or look blue or pale  © Copyright [address] 2022 Information is for End User's use only and may not be sold, redistributed or otherwise used for commercial purposes  All illustrations and images included in CareNotes® are the copyrighted property of [employer]  or [place] Tony Chandra   The above information is an  only  It is not intended as medical advice for individual conditions or treatments  Talk to your doctor, nurse or pharmacist before following any medical regimen to see if it is safe and effective for you

## 2022-04-07 NOTE — PROGRESS NOTES
Orthopaedic Surgery - Office Note  Valeria Child (84 y o  female)   : 1955   MRN: [id number]  Encounter Date: 2022    Chief Complaint   Patient presents with    Left Wrist - Pain         Assessment/Plan  Diagnoses and all orders for this visit:    Pain in left wrist    De Quervain's tenosynovitis, left    Other orders  -     Small joint arthrocentesis  -     Medium joint arthrocentesis    The diagnosis as well as treatment options were reviewed  Risks and benefits of a cortisone injection specifically as they relate to increased risk of infection and decreasing healing from an incision were reviewed  All questions concerns were answered  She will continue doing the home stretching programs as provided by occupational therapy  She will continue the thumb spica splint  She will keep her scheduled appointment with the hand surgeon on May 9, 2022  No follow-ups on file  History of Present Illness  This is a previous patient here for left wrist pain  She was treated by myself and was last seen on 2022  She has been treating for de Quervain tenosynovitis  She has been going to occupational therapy, using the thumb spica brace, treated with an oral steroid, and been icing  She is left with persistent discomfort and would like to consider a cortisone injection  No new trauma is reported  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  She did have recent skin cancer removed from her thigh and is willing to accept the risks of healing issues with a cortisone injection  Review of Systems  Pertinent items are noted in HPI  All other systems were reviewed and are negative  Physical Exam  /78   Ht 5' 3" (1 6 m)   Wt 62 1 kg (137 lb)   BMI 24 27 kg/m²   Cons: Appears well  No apparent distress  Psych: Alert  Oriented x3  Mood and affect normal   Eyes: PERRLA, EOMI  Resp: Normal effort  No audible wheezing or stridor  CV: Palpable pulse    No discernable arrhythmia  Lymph:  No palpable cervical, axillary, or inguinal lymphadenopathy  Skin: Warm  No palpable masses  No visible lesions  Neuro: Normal muscle tone  Normal and symmetric DTR's  Left wrist has no skin breakdown lesions or signs of infection  She is markedly tender to palpation at the radial styloid  She has a positive Finkelstein's test   She is neurovascularly grossly intact  She has well-maintained wrist range of motion to extension flexion ulnar radial deviation as well as supination and pronation  There is no muscle atrophy or wasting pre she did  She is neurovascularly intact with a negative Tinel's  Capillary refills within normal limits  Studies Reviewed  X-ray images as well as my last note were reviewed  Medium joint arthrocentesis  Universal Protocol:  Consent: Verbal consent obtained  Risks and benefits: risks, benefits and alternatives were discussed  Consent given by: patient  Time out: Immediately prior to procedure a "time out" was called to verify the correct patient, procedure, equipment, support staff and site/side marked as required  Patient understanding: patient states understanding of the procedure being performed  Patient consent: the patient's understanding of the procedure matches consent given  Relevant documents: relevant documents present and verified  Test results: test results available and properly labeled  Site marked: the operative site was marked  Radiology Images displayed and confirmed  If images not available, report reviewed: imaging studies available  Patient identity confirmed: verbally with patient    Supporting Documentation  Indications: pain   Procedure Details  Location: wrist - Wrist joint: left de quervains    Preparation: Patient was prepped and draped in the usual sterile fashion  Needle size: 25 G  Ultrasound guidance: no  Medications administered: 0 5 mL lidocaine 1 %; 0 5 mL methylPREDNISolone acetate 80 mg/mL    Patient tolerance: patient tolerated the procedure well with no immediate complications  Dressing:  Sterile dressing applied        Medical, Surgical, Family, and Social History  The patient's medical history, family history, and social history, were reviewed and updated as appropriate      Past Medical History:   Diagnosis Date    Arthritis     Exercise involving walking     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 NOS,EAR,LID <20 SQCM Left 1/21/2022    Procedure: SKIN GRAFT;  Surgeon: Nina Strauss MD;  [facility];  Service: Plastics    SKIN BIOPSY      local to remove skin cancer    TONSILLECTOMY      WISDOM TOOTH EXTRACTION         Family History   Problem Relation Age of Onset    Hypertension Mother     Hypertension Father     Hypertension Sister     Hypertension Brother        Social History     Occupational History    Not on file   Tobacco Use    Smoking status: Former Smoker    Smokeless tobacco: Never Used    Tobacco comment: "I smoked in college, long ago  ; never a smoker & no passive smoke exposure as per [employer]   Vaping Use    Vaping Use: Never used   Substance and Sexual Activity    Alcohol use: Yes     Comment: "rarely"    Drug use: No    Sexual activity: Not on file     Comment: defer       No Known Allergies      Current Outpatient Medications:     amLODIPine (NORVASC) 2 5 mg tablet, Take 2 5 mg by mouth daily, Disp: , Rfl:     losartan (COZAAR) 50 mg tablet, Take 1 tablet (50 mg total) by mouth daily, Disp: 90 tablet, Rfl: 1      Gunner Staples PA-C

## 2022-04-23 ENCOUNTER — APPOINTMENT (OUTPATIENT)
Dept: LAB | Age: 67
End: 2022-04-23
Payer: COMMERCIAL

## 2022-04-23 DIAGNOSIS — I10 ESSENTIAL HYPERTENSION: ICD-10-CM

## 2022-04-23 DIAGNOSIS — E78.2 MIXED HYPERLIPIDEMIA: ICD-10-CM

## 2022-04-23 LAB
ALBUMIN SERPL BCP-MCNC: 3.5 G/DL (ref 3.5–5)
ALP SERPL-CCNC: 79 U/L (ref 46–116)
ALT SERPL W P-5'-P-CCNC: 28 U/L (ref 12–78)
ANION GAP SERPL CALCULATED.3IONS-SCNC: 3 MMOL/L (ref 4–13)
AST SERPL W P-5'-P-CCNC: 21 U/L (ref 5–45)
BASOPHILS # BLD AUTO: 0.03 THOUSANDS/ΜL (ref 0–0.1)
BASOPHILS NFR BLD AUTO: 1 % (ref 0–1)
BILIRUB SERPL-MCNC: 0.41 MG/DL (ref 0.2–1)
BUN SERPL-MCNC: 20 MG/DL (ref 5–25)
CALCIUM SERPL-MCNC: 9.3 MG/DL (ref 8.3–10.1)
CHLORIDE SERPL-SCNC: 109 MMOL/L (ref 100–108)
CHOLEST SERPL-MCNC: 264 MG/DL
CO2 SERPL-SCNC: 30 MMOL/L (ref 21–32)
CREAT SERPL-MCNC: 0.68 MG/DL (ref 0.6–1.3)
EOSINOPHIL # BLD AUTO: 0.21 THOUSAND/ΜL (ref 0–0.61)
EOSINOPHIL NFR BLD AUTO: 5 % (ref 0–6)
ERYTHROCYTE [DISTWIDTH] IN BLOOD BY AUTOMATED COUNT: 12.8 % (ref 11.6–15.1)
GFR SERPL CREATININE-BSD FRML MDRD: 90 ML/MIN/1.73SQ M
GLUCOSE SERPL-MCNC: 97 MG/DL (ref 65–140)
HCT VFR BLD AUTO: 42.5 % (ref 34.8–46.1)
HDLC SERPL-MCNC: 91 MG/DL
HGB BLD-MCNC: 13.5 G/DL (ref 11.5–15.4)
IMM GRANULOCYTES # BLD AUTO: 0.01 THOUSAND/UL (ref 0–0.2)
IMM GRANULOCYTES NFR BLD AUTO: 0 % (ref 0–2)
LDLC SERPL CALC-MCNC: 160 MG/DL (ref 0–100)
LYMPHOCYTES # BLD AUTO: 1.73 THOUSANDS/ΜL (ref 0.6–4.47)
LYMPHOCYTES NFR BLD AUTO: 42 % (ref 14–44)
MCH RBC QN AUTO: 29.7 PG (ref 26.8–34.3)
MCHC RBC AUTO-ENTMCNC: 31.8 G/DL (ref 31.4–37.4)
MCV RBC AUTO: 94 FL (ref 82–98)
MONOCYTES # BLD AUTO: 0.36 THOUSAND/ΜL (ref 0.17–1.22)
MONOCYTES NFR BLD AUTO: 9 % (ref 4–12)
NEUTROPHILS # BLD AUTO: 1.74 THOUSANDS/ΜL (ref 1.85–7.62)
NEUTS SEG NFR BLD AUTO: 43 % (ref 43–75)
NRBC BLD AUTO-RTO: 0 /100 WBCS
PLATELET # BLD AUTO: 360 THOUSANDS/UL (ref 149–390)
PMV BLD AUTO: 10 FL (ref 8.9–12.7)
POTASSIUM SERPL-SCNC: 4.2 MMOL/L (ref 3.5–5.3)
PROT SERPL-MCNC: 6.9 G/DL (ref 6.4–8.2)
RBC # BLD AUTO: 4.54 MILLION/UL (ref 3.81–5.12)
SODIUM SERPL-SCNC: 142 MMOL/L (ref 136–145)
TRIGL SERPL-MCNC: 66 MG/DL
TSH SERPL DL<=0.05 MIU/L-ACNC: 1.11 UIU/ML (ref 0.45–4.5)
WBC # BLD AUTO: 4.08 THOUSAND/UL (ref 4.31–10.16)

## 2022-04-23 PROCEDURE — 84443 ASSAY THYROID STIM HORMONE: CPT

## 2022-04-23 PROCEDURE — 36415 COLL VENOUS BLD VENIPUNCTURE: CPT

## 2022-04-23 PROCEDURE — 85025 COMPLETE CBC W/AUTO DIFF WBC: CPT

## 2022-04-23 PROCEDURE — 80053 COMPREHEN METABOLIC PANEL: CPT

## 2022-04-23 PROCEDURE — 80061 LIPID PANEL: CPT

## 2022-04-27 ENCOUNTER — TELEPHONE (OUTPATIENT)
Dept: FAMILY MEDICINE CLINIC | Facility: CLINIC | Age: 67
End: 2022-04-27

## 2022-04-27 NOTE — TELEPHONE ENCOUNTER
Pt aware of results and she did did get a call also from cardiologist to make her aware of elevated cholesterol   She will discuss starting the crestor with cardiologist and call back if she decided to start on it

## 2022-04-27 NOTE — TELEPHONE ENCOUNTER
----- Message from [address] sent at 4/27/2022  9:28 AM EDT -----  - Labs show elevated cholesterol  Her total cholesterol is 264 (normal is under 200) and her LDL cholesterol is 160 (normal is under 100)  I recommend she start Crestor 10 mg daily  If patient agreeable please place order for medication and repeat lipid panel in 2 months  - The rest of her labs are stable

## 2022-05-17 ENCOUNTER — VBI (OUTPATIENT)
Dept: ADMINISTRATIVE | Facility: OTHER | Age: 67
End: 2022-05-17

## 2022-05-27 ENCOUNTER — VBI (OUTPATIENT)
Dept: ADMINISTRATIVE | Facility: OTHER | Age: 67
End: 2022-05-27

## 2022-06-27 ENCOUNTER — OFFICE VISIT (OUTPATIENT)
Dept: OBGYN CLINIC | Facility: CLINIC | Age: 67
End: 2022-06-27
Payer: COMMERCIAL

## 2022-06-27 VITALS
WEIGHT: 135 LBS | SYSTOLIC BLOOD PRESSURE: 158 MMHG | HEIGHT: 63 IN | DIASTOLIC BLOOD PRESSURE: 78 MMHG | BODY MASS INDEX: 23.92 KG/M2

## 2022-06-27 DIAGNOSIS — M65.4 DE QUERVAIN'S TENOSYNOVITIS, LEFT: Primary | ICD-10-CM

## 2022-06-27 PROCEDURE — 99214 OFFICE O/P EST MOD 30 MIN: CPT | Performed by: ORTHOPAEDIC SURGERY

## 2022-06-27 PROCEDURE — 1036F TOBACCO NON-USER: CPT | Performed by: ORTHOPAEDIC SURGERY

## 2022-06-27 PROCEDURE — 20550 NJX 1 TENDON SHEATH/LIGAMENT: CPT | Performed by: ORTHOPAEDIC SURGERY

## 2022-06-27 PROCEDURE — 3008F BODY MASS INDEX DOCD: CPT | Performed by: ORTHOPAEDIC SURGERY

## 2022-06-27 PROCEDURE — 1160F RVW MEDS BY RX/DR IN RCRD: CPT | Performed by: ORTHOPAEDIC SURGERY

## 2022-06-27 RX ORDER — LIDOCAINE HYDROCHLORIDE 20 MG/ML
1 INJECTION, SOLUTION EPIDURAL; INFILTRATION; INTRACAUDAL; PERINEURAL
Status: COMPLETED | OUTPATIENT
Start: 2022-06-27 | End: 2022-06-27

## 2022-06-27 RX ORDER — BETAMETHASONE SODIUM PHOSPHATE AND BETAMETHASONE ACETATE 3; 3 MG/ML; MG/ML
6 INJECTION, SUSPENSION INTRA-ARTICULAR; INTRALESIONAL; INTRAMUSCULAR; SOFT TISSUE
Status: COMPLETED | OUTPATIENT
Start: 2022-06-27 | End: 2022-06-27

## 2022-06-27 RX ADMIN — LIDOCAINE HYDROCHLORIDE 1 ML: 20 INJECTION, SOLUTION EPIDURAL; INFILTRATION; INTRACAUDAL; PERINEURAL at 11:35

## 2022-06-27 RX ADMIN — BETAMETHASONE SODIUM PHOSPHATE AND BETAMETHASONE ACETATE 6 MG: 3; 3 INJECTION, SUSPENSION INTRA-ARTICULAR; INTRALESIONAL; INTRAMUSCULAR; SOFT TISSUE at 11:35

## 2022-06-27 NOTE — PATIENT INSTRUCTIONS
What is Fátima Shelialy Syndrome? Patients with de Quervain syndrome have painful tendons on the thumb side of the wrist  Tendons are the ropes that the muscle uses to pull the bone  You can see them on the back of your hand when you straighten your fingers  In  de Quervain syndrome, the tunnel (the first extensor compartment; see Figure 1A-B) where the tendons run narrows due to the thickening of the soft tissues that make up the tunnel  Hand and thumb motion cause pain, especially with forceful grasping or twisting  Causes  Doctors are not sure what causes de Quervain syndrome  Patients often describe a feeling of inflammation, but studies have shown that the process is not inflammatory  People of all ages get it  When new mothers develop  de Quervain syndrome, it typically appears 4 to 6 weeks after delivery  The old theory that it was caused by wringing out cloth diapers has been replaced by concerns about holding the baby, but changes in hormones and swelling seem more probable  Treatment  Treatments that can relieve symptoms include:   A splint that stops you from moving your thumb and wrist    Tylenol or aspirin type medications (e g , ibuprofen)  Treatments that attempt to change the course of the disease include:   A cortisone-type of steroid injection into the tendon compartment  It has not been clearly established that    these injections change the course of the disease and response to the injection varies  Surgery to open the tunnel and make more room for the tendons  © 2012 American [employer]  www handcare  org

## 2022-06-27 NOTE — PROGRESS NOTES
ASSESSMENT/PLAN:    Assessment:   de Quervain stenosis tenosynovitis  left    Plan:   2nd CS injection provided today    Follow Up:  8  week(s)    To Do Next Visit:  recheck    General Discussions:     Irwin Faye Tenosynovitis: The anatomy and physiology of de Quervain's tenosynovitis was discussed with the patient today in the office  Edema and increased contact pressure within the first dorsal extensor compartment at the radial styloid can cause pain, crepitation, and limitation of function  Treatment options include resting thumb spica splints to decrease edema, oral anti-inflammatory medications, home or formal therapy exercises, up to 2 steroid injections within the first dorsal extensor compartment, or surgical release  While majority of patients do respond to conservative treatment, up to 20% may require surgical release          _____________________________________________________  CHIEF COMPLAINT:  Chief Complaint   Patient presents with    Left Wrist - Pain     Referred by Kristin Faye         SUBJECTIVE:  Betty Fish is a 78 y o  female who presents with Pain  Moderate  Intermittant  Sharp, Dull and Aching to the left wrist      Patient was referred here today by Kristin Stewart PA-C  She was provided with a CS injection on 4/07/22  She has tried OT and a thumb spica brace without benefit       PAST MEDICAL HISTORY:  Past Medical History:   Diagnosis Date    Arthritis     Exercise involving walking     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[address], "very active" per pt    History of basal cell cancer     Hyperlipidemia     Hypertension     Sees Dr Naga [facility] cardio/Echo 1/17    Osteoarthritis 6/5/2007    Skin cancer 07/16/2015    right thigh    Wears glasses        PAST SURGICAL HISTORY:  Past Surgical History:   Procedure Laterality Date    FLAP LOCAL HEAD / NECK Bilateral 1/21/2022    Procedure: EXCISION OF LEFT EAR & RIGHT EYEBROW SKIN CANCER W/LOCAL FLAP;  Surgeon: Sharmaine Mercado Idalmis Hernandez MD;  [facility];  Service: Plastics    MT FULL THICK 2011 [facility] NOS,EAR,LID <20 SQCM Left 1/21/2022    Procedure: SKIN GRAFT;  Surgeon: Sugar Flores MD;  [facility];  Service: Plastics    SKIN BIOPSY      local to remove skin cancer    TONSILLECTOMY      WISDOM TOOTH EXTRACTION         FAMILY HISTORY:  Family History   Problem Relation Age of Onset    Hypertension Mother     Hypertension Father     Hypertension Sister     Hypertension Brother        SOCIAL HISTORY:  Social History     Tobacco Use    Smoking status: Former Smoker    Smokeless tobacco: Never Used    Tobacco comment: "I smoked in college, long ago  ; never a smoker & no passive smoke exposure as per [employer]   Vaping Use    Vaping Use: Never used   Substance Use Topics    Alcohol use: Yes     Comment: "rarely"    Drug use: No       MEDICATIONS:    Current Outpatient Medications:     amLODIPine (NORVASC) 2 5 mg tablet, Take 2 5 mg by mouth daily, Disp: , Rfl:     losartan (COZAAR) 50 mg tablet, Take 1 tablet (50 mg total) by mouth daily, Disp: 90 tablet, Rfl: 1    ALLERGIES:  No Known Allergies    REVIEW OF SYSTEMS:  Pertinent items are noted in HPI  A comprehensive review of systems was negative      LABS:  HgA1c: No results found for: HGBA1C  BMP:   Lab Results   Component Value Date    CALCIUM 9 3 04/23/2022    K 4 2 04/23/2022    CO2 30 04/23/2022     (H) 04/23/2022    BUN 20 04/23/2022    CREATININE 0 68 04/23/2022         _____________________________________________________  PHYSICAL EXAMINATION:  Vital signs: Ht 5' 3" (1 6 m)   Wt 61 2 kg (135 lb)   BMI 23 91 kg/m²   General: well developed and well nourished, alert, oriented times 3 and appears comfortable  Psychiatric: Normal  HEENT: Trachea Midline, No torticollis  Cardiovascular: No discernable arrhythmia  Pulmonary: No wheezing or stridor  Abdomen: No rebound or guarding  Extremities: No peripheral edema  Skin: No masses, erythema, lacerations, fluctation, ulcerations  Neurovascular: Sensation Intact to the Median, Ulnar, Radial Nerve, Motor Intact to the Median, Ulnar, Radial Nerve and Pulses Intact    MUSCULOSKELETAL EXAMINATION:  De Quervain's Tenosynovitis Exam:  left side  Positive tender to palpation over 1st dorsal extensor compartment   Positive palpable nodule  Positive crepitus over 1st dorsal extensor compartment   Positive Finkelstein's test      _____________________________________________________  STUDIES REVIEWED:  No Studies to review      PROCEDURES PERFORMED:  Hand/upper extremity injection: L extensor compartment 1  Universal Protocol:  Consent given by: patient  Patient understanding: patient states understanding of the procedure being performed  Site marked: the operative site was marked  Patient identity confirmed: verbally with patient    Supporting Documentation  Indications: pain   Procedure Details  Condition:de Quervain's tenosynovitis Site: L extensor compartment 1   Needle size: 25 G  Ultrasound guidance: no  Approach: radial  Medications administered: 6 mg betamethasone acetate-betamethasone sodium phosphate 6 (3-3) mg/mL; 1 mL lidocaine (PF) 2 %    Patient tolerance: patient tolerated the procedure well with no immediate complications  Dressing:  Sterile dressing applied             Scribe Attestation    I,:  Anny Infante am acting as a scribe while in the presence of the attending physician :       I,:  Louann Ashton MD personally performed the services described in this documentation    as scribed in my presence :

## 2022-07-08 ENCOUNTER — VBI (OUTPATIENT)
Dept: ADMINISTRATIVE | Facility: OTHER | Age: 67
End: 2022-07-08

## 2022-07-22 DIAGNOSIS — I10 ESSENTIAL HYPERTENSION: ICD-10-CM

## 2022-07-24 DIAGNOSIS — I10 ESSENTIAL HYPERTENSION: ICD-10-CM

## 2022-07-25 RX ORDER — LOSARTAN POTASSIUM 50 MG/1
50 TABLET ORAL DAILY
Qty: 90 TABLET | Refills: 0 | Status: SHIPPED | OUTPATIENT
Start: 2022-07-25 | End: 2022-10-21

## 2022-07-25 RX ORDER — LOSARTAN POTASSIUM 100 MG/1
TABLET ORAL
Qty: 90 TABLET | Refills: 1 | OUTPATIENT
Start: 2022-07-25

## 2022-08-08 ENCOUNTER — TELEPHONE (OUTPATIENT)
Dept: FAMILY MEDICINE CLINIC | Facility: CLINIC | Age: 67
End: 2022-08-08

## 2022-08-08 DIAGNOSIS — N64.4 BREAST PAIN: Primary | ICD-10-CM

## 2022-08-08 NOTE — TELEPHONE ENCOUNTER
We ordered a mammogram for this patient and when she scheduled it she told them she is having pain in right breast and they recommended we order a diagnostic mammogram     Call her at [phone]

## 2022-08-19 ENCOUNTER — HOSPITAL ENCOUNTER (OUTPATIENT)
Dept: ULTRASOUND IMAGING | Facility: CLINIC | Age: 67
Discharge: HOME | End: 2022-08-19
Payer: COMMERCIAL

## 2022-08-19 ENCOUNTER — HOSPITAL ENCOUNTER (OUTPATIENT)
Dept: MAMMOGRAPHY | Facility: CLINIC | Age: 67
Discharge: HOME | End: 2022-08-19
Payer: COMMERCIAL

## 2022-08-19 VITALS — WEIGHT: 135 LBS | BODY MASS INDEX: 23.92 KG/M2 | HEIGHT: 63 IN

## 2022-08-19 DIAGNOSIS — N64.4 BREAST PAIN: ICD-10-CM

## 2022-08-19 PROCEDURE — G0279 TOMOSYNTHESIS, MAMMO: HCPCS

## 2022-08-19 PROCEDURE — 76642 ULTRASOUND BREAST LIMITED: CPT

## 2022-08-19 PROCEDURE — 77066 DX MAMMO INCL CAD BI: CPT

## 2022-08-22 ENCOUNTER — OFFICE VISIT (OUTPATIENT)
Dept: OBGYN CLINIC | Facility: CLINIC | Age: 67
End: 2022-08-22
Payer: COMMERCIAL

## 2022-08-22 VITALS
SYSTOLIC BLOOD PRESSURE: 148 MMHG | WEIGHT: 131 LBS | DIASTOLIC BLOOD PRESSURE: 84 MMHG | HEIGHT: 63 IN | BODY MASS INDEX: 23.21 KG/M2

## 2022-08-22 DIAGNOSIS — M65.4 DE QUERVAIN'S TENOSYNOVITIS, LEFT: Primary | ICD-10-CM

## 2022-08-22 PROCEDURE — 20550 NJX 1 TENDON SHEATH/LIGAMENT: CPT | Performed by: ORTHOPAEDIC SURGERY

## 2022-08-22 PROCEDURE — 1160F RVW MEDS BY RX/DR IN RCRD: CPT | Performed by: ORTHOPAEDIC SURGERY

## 2022-08-22 PROCEDURE — 99213 OFFICE O/P EST LOW 20 MIN: CPT | Performed by: ORTHOPAEDIC SURGERY

## 2022-08-22 RX ORDER — ROSUVASTATIN CALCIUM 5 MG/1
TABLET, COATED ORAL
COMMUNITY
Start: 2022-08-21

## 2022-08-22 RX ORDER — AMPICILLIN TRIHYDRATE 250 MG
CAPSULE ORAL DAILY
COMMUNITY
Start: 2022-07-26

## 2022-08-22 RX ADMIN — BETAMETHASONE SODIUM PHOSPHATE AND BETAMETHASONE ACETATE 3 MG: 3; 3 INJECTION, SUSPENSION INTRA-ARTICULAR; INTRALESIONAL; INTRAMUSCULAR; SOFT TISSUE at 17:58

## 2022-08-22 RX ADMIN — BUPIVACAINE HYDROCHLORIDE 0.5 ML: 2.5 INJECTION, SOLUTION EPIDURAL; INFILTRATION; INTRACAUDAL at 17:58

## 2022-08-22 NOTE — PROGRESS NOTES
ASSESSMENT/PLAN:    Assessment:   de Quervain stenosis tenosynovitis  left    Plan:   Repeat left 1st dorsal extensor compartment corticosteroid injection offered to the patient at today's visit  We discussed the risks and benefits of corticosteroid injection today in the office and she did elect to proceed  The left 1st dorsal extensor compartment corticosteroid injection was administered without issue and well tolerated by the patient  This was done under sterile technique  Post injection instructions were provided  She understands can be repeated no sooner than three months  Follow Up:  8  week(s)    To Do Next Visit:    reassess current condition    General Discussions:     Steffanie Joya Tenosynovitis: The anatomy and physiology of de Quervain's tenosynovitis was discussed with the patient today in the office  Edema and increased contact pressure within the first dorsal extensor compartment at the radial styloid can cause pain, crepitation, and limitation of function  Treatment options include resting thumb spica splints to decrease edema, oral anti-inflammatory medications, home or formal therapy exercises, up to 2 steroid injections within the first dorsal extensor compartment, or surgical release  While majority of patients do respond to conservative treatment, up to 20% may require surgical release        _____________________________________________________  CHIEF COMPLAINT:  Chief Complaint   Patient presents with    Left Wrist - Follow-up     Johnny Ladd- 2nd injection 6/27/22          SUBJECTIVE:  Jude Nicole is a 75 y o  female who presents for follow up regarding de Quervain stenosis tenosynovitis  left  Since last visit, Jude Nicole has tried steroid injections with only partial relief  Today there is Pain  Moderate  Intermittant  Sharp to the left thumb 1st dorsal compartment      Radiation: None  Associated symptoms: Pain  Moderate  Intermittant  Sharp  Handedness: right    PAST MEDICAL HISTORY:  Past Medical History:   Diagnosis Date    Arthritis     Exercise involving walking     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[address], "very active" per pt    History of basal cell cancer     Hyperlipidemia     Hypertension     Sees Dr Devon She[facility] cardio/Echo 1/17    Osteoarthritis 6/5/2007    Skin cancer 07/16/2015    right thigh    Wears glasses        PAST SURGICAL HISTORY:  Past Surgical History:   Procedure Laterality Date    FLAP LOCAL HEAD / NECK Bilateral 1/21/2022    Procedure: EXCISION OF LEFT EAR & RIGHT EYEBROW SKIN CANCER W/LOCAL FLAP;  Surgeon: Julio Bazan MD;  [facility];  Service: Plastics    SD FULL THICK 2011 [facility] NOS,EAR,LID <20 SQCM Left 1/21/2022    Procedure: SKIN GRAFT;  Surgeon: Julio Bazan MD;  [facility];  Service: Plastics    SKIN BIOPSY      local to remove skin cancer    TONSILLECTOMY      WISDOM TOOTH EXTRACTION         FAMILY HISTORY:  Family History   Problem Relation Age of Onset    Hypertension Mother     Hypertension Father     Hypertension Sister     No Known Problems Maternal Grandmother     No Known Problems Maternal Grandfather     No Known Problems Paternal Grandmother     No Known Problems Paternal Grandfather     Hypertension Brother     No Known Problems Son     No Known Problems Son     Breast cancer Neg Hx        SOCIAL HISTORY:  Social History     Tobacco Use    Smoking status: Former Smoker    Smokeless tobacco: Never Used    Tobacco comment: "I smoked in college, long ago  ; never a smoker & no passive smoke exposure as per [employer]   Vaping Use    Vaping Use: Never used   Substance Use Topics    Alcohol use: Yes     Comment: "rarely"    Drug use: No       MEDICATIONS:    Current Outpatient Medications:     amLODIPine (NORVASC) 2 5 mg tablet, Take 2 5 mg by mouth daily, Disp: , Rfl:     Co Q-10 50 MG, Take by mouth daily, Disp: , Rfl:     losartan (COZAAR) 50 mg tablet, Take 1 tablet (50 mg total) by mouth daily, Disp: 90 tablet, Rfl: 0    rosuvastatin (CRESTOR) 5 mg tablet, , Disp: , Rfl:     ALLERGIES:  No Known Allergies    REVIEW OF SYSTEMS:  Pertinent items are noted in HPI  A comprehensive review of systems was negative  LABS:  HgA1c: No results found for: HGBA1C  BMP:   Lab Results   Component Value Date    CALCIUM 9 3 04/23/2022    K 4 2 04/23/2022    CO2 30 04/23/2022     (H) 04/23/2022    BUN 20 04/23/2022    CREATININE 0 68 04/23/2022           _____________________________________________________  PHYSICAL EXAMINATION:  Vital signs: /84   Ht 5' 3" (1 6 m)   Wt 59 4 kg (131 lb)   BMI 23 21 kg/m²   General: well developed and well nourished, alert, oriented times 3 and appears comfortable  Psychiatric: Normal  HEENT: Trachea Midline, No torticollis  Cardiovascular: No discernable arrhythmia  Pulmonary: No wheezing or stridor  Abdomen: No rebound or guarding  Extremities: No peripheral edema  Skin: No masses, erythema, lacerations, fluctation, ulcerations  Neurovascular: Sensation Intact to the Median, Ulnar, Radial Nerve, Motor Intact to the Median, Ulnar, Radial Nerve and Pulses Intact    MUSCULOSKELETAL EXAMINATION:  LEFT SIDE:  Evonne Omayra:  Positive Finkelstein's Test, tenderness to palpation 1st dorsal extensor compartment    _____________________________________________________  STUDIES REVIEWED:  No Studies to review      PROCEDURES PERFORMED:  Hand/upper extremity injection: L extensor compartment 1  Universal Protocol:  Consent: Verbal consent obtained  Risks and benefits: risks, benefits and alternatives were discussed  Consent given by: patient  Time out: Immediately prior to procedure a "time out" was called to verify the correct patient, procedure, equipment, support staff and site/side marked as required    Timeout called at: 8/22/2022 5:55 PM   Site marked: the operative site was marked  Supporting Documentation  Indications: pain and joint swelling   Procedure Details  Condition:de Quervain's tenosynovitis Site: L extensor compartment 1   Needle size: 25 G  Ultrasound guidance: no  Approach: dorsal  Medications administered: 0 5 mL bupivacaine (PF) 0 25 %; 3 mg betamethasone acetate-betamethasone sodium phosphate 6 (3-3) mg/mL    Patient tolerance: patient tolerated the procedure well with no immediate complications  Dressing:  Sterile dressing applied               Scribe Attestation    I,:  Elfida Slim am acting as a scribe while in the presence of the attending physician :       I,:  Henri Merritt MD personally performed the services described in this documentation    as scribed in my presence :         Scribe Attestation    I,:  Elfida Slim am acting as a scribe while in the presence of the attending physician :       I,:  Henri Merritt MD personally performed the services described in this documentation    as scribed in my presence :

## 2022-08-23 RX ORDER — BUPIVACAINE HYDROCHLORIDE 2.5 MG/ML
0.5 INJECTION, SOLUTION EPIDURAL; INFILTRATION; INTRACAUDAL
Status: COMPLETED | OUTPATIENT
Start: 2022-08-22 | End: 2022-08-22

## 2022-08-23 RX ORDER — BETAMETHASONE SODIUM PHOSPHATE AND BETAMETHASONE ACETATE 3; 3 MG/ML; MG/ML
3 INJECTION, SUSPENSION INTRA-ARTICULAR; INTRALESIONAL; INTRAMUSCULAR; SOFT TISSUE
Status: COMPLETED | OUTPATIENT
Start: 2022-08-22 | End: 2022-08-22

## 2022-09-01 ENCOUNTER — VBI (OUTPATIENT)
Dept: ADMINISTRATIVE | Facility: OTHER | Age: 67
End: 2022-09-01

## 2022-09-21 ENCOUNTER — VBI (OUTPATIENT)
Dept: ADMINISTRATIVE | Facility: OTHER | Age: 67
End: 2022-09-21

## 2022-09-26 ENCOUNTER — RA CDI HCC (OUTPATIENT)
Dept: OTHER | Facility: HOSPITAL | Age: 67
End: 2022-09-26

## 2022-09-26 NOTE — PROGRESS NOTES
[facility]       Chart reviewed, no opportunity found: CHART REVIEWED, NO OPPORTUNITY FOUND        Patients Insurance        Commercial Insurance: [address] [place] [address]

## 2022-10-11 ENCOUNTER — VBI (OUTPATIENT)
Dept: ADMINISTRATIVE | Facility: OTHER | Age: 67
End: 2022-10-11

## 2022-10-11 PROBLEM — Z00.00 HEALTHCARE MAINTENANCE: Status: RESOLVED | Noted: 2022-04-05 | Resolved: 2022-10-11

## 2022-10-12 PROBLEM — J06.9 ACUTE UPPER RESPIRATORY INFECTION: Status: RESOLVED | Noted: 2021-03-11 | Resolved: 2022-10-12

## 2022-10-21 DIAGNOSIS — I10 ESSENTIAL HYPERTENSION: ICD-10-CM

## 2022-10-21 RX ORDER — LOSARTAN POTASSIUM 50 MG/1
50 TABLET ORAL DAILY
Qty: 30 TABLET | Refills: 0 | Status: SHIPPED | OUTPATIENT
Start: 2022-10-21

## 2022-10-21 RX ORDER — LOSARTAN POTASSIUM 50 MG/1
TABLET ORAL
Qty: 30 TABLET | Refills: 0 | Status: SHIPPED | OUTPATIENT
Start: 2022-10-21 | End: 2022-10-21 | Stop reason: SDUPTHER

## 2022-10-25 ENCOUNTER — RA CDI HCC (OUTPATIENT)
Dept: OTHER | Facility: HOSPITAL | Age: 67
End: 2022-10-25

## 2022-10-25 NOTE — PROGRESS NOTES
[facility]       Chart reviewed, no opportunity found: CHART REVIEWED, NO OPPORTUNITY FOUND        Patients Insurance        Commercial Insurance: [address] [place] [address]

## 2022-10-31 ENCOUNTER — OFFICE VISIT (OUTPATIENT)
Dept: OBGYN CLINIC | Facility: CLINIC | Age: 67
End: 2022-10-31

## 2022-10-31 VITALS
BODY MASS INDEX: 22.89 KG/M2 | HEIGHT: 63 IN | WEIGHT: 129.2 LBS | DIASTOLIC BLOOD PRESSURE: 80 MMHG | SYSTOLIC BLOOD PRESSURE: 100 MMHG

## 2022-10-31 DIAGNOSIS — M65.4 DE QUERVAIN'S TENOSYNOVITIS, LEFT: Primary | ICD-10-CM

## 2022-10-31 NOTE — PROGRESS NOTES
ASSESSMENT/PLAN:    Assessment:   de Quervain stenosis tenosynovitis  left    Plan:   Patient will be scheduled for left de Quervain tenosynovitis release  Detail consent was obtained   Risks and benefits to surgery were discussed   She will follow-up after surgery for suture removal    Follow Up: After Surgery    To Do Next Visit:  Sutures out    General Discussions:  Ignacio Power Tenosynovitis: The anatomy and physiology of de Quervain's tenosynovitis was discussed with the patient today in the office  Edema and increased contact pressure within the first dorsal extensor compartment at the radial styloid can cause pain, crepitation, and limitation of function  Treatment options include resting thumb spica splints to decrease edema, oral anti-inflammatory medications, home or formal therapy exercises, up to 2 steroid injections within the first dorsal extensor compartment, or surgical release  While majority of patients do respond to conservative treatment, up to 20% may require surgical release  Operative Discussions:  Igncaio Power Release: The anatomy and physiology of de Quervain's tenosynovitis was discussed with the patient today in the office  Edema and increased contact pressure within the first dorsal extensor compartment at the radial styloid can cause pain, crepitation, and limitation of function  Treatment options include resting thumb spica splints to decrease edema, oral anti-inflammatory medications, home or formal therapy exercises, up to 2 steroid injections within the first dorsal extensor compartment, or surgical release  While majority of patients do respond to conservative treatment, up to 20% may require surgical release  The patient has elected to undergo a release of the first dorsal extensor compartment (de Quervain's)    A small incision will be made over the radial styloid of the wrist, the tendon sheath holding the abductor pollicis longus and extensor pollicis brevis will be released  In the postoperative period, light activities are allowed immediately, driving is allowed when narcotic medication has stopped, and the incision may get wet after 2 days  Heavy activities (lifting more than approximately 10 pounds) will be allowed after the follow up appointment in 1-2 weeks  While the pain and discomfort within the wrist typically improves rapidly, some residual discomfort may be present for up to 6 weeks  The patient may notice temporary numbness within the superficial sensory branch of the radial nerve secondary to a traction neuropraxia  This is often a self-limiting condition  The patient has an understanding of the above mentioned discussion  Approximate success rate is 98%  The risks and benefits of the procedure were explained to the patient, which include, but are not limited to: Bleeding, infection, recurrence, pain, scar, damage to tendons, damage to nerves, and damage to blood vessels, failure to give desired results and complications related to anesthesia   These risks, along with alternative conservative treatment options, and postoperative protocols were voiced back and understood by the patient   All questions were answered to the patient's satisfaction   The patient agrees to comply with a standard postoperative protocol, and is willing to proceed   Education was provided via written and auditory forms   There were no barriers to learning   Written handouts regarding wound care, incision and scar care, and general preoperative information was provided to the patient   Prior to surgery, the patient may be requested to stop all anti-inflammatory medications   Prophylactic aspirin, Plavix, and Coumadin may be allowed to be continued   Medications including vitamin E , ginkgo, and fish oil are requested to be stopped approximately one week prior to surgery   Hypertensive medications and beta blockers, if taken, should be continued  _____________________________________________________  CHIEF COMPLAINT:  Chief Complaint   Patient presents with   • Left Wrist - Follow-up, De Querjoseph's      Wants to discuss possible surgery          SUBJECTIVE:  Jessica Manoz is a 75 y o  female who presents for follow up regarding de Quervain stenosis tenosynovitis  left  Since last visit, Jessica Manzo has tried steroid injections with only partial relief    Today there is Pain  Mild  Intermittant  Dull and Aching to the left wrist     Radiation: None  Associated symptoms: None  Handedness: right      PAST MEDICAL HISTORY:  Past Medical History:   Diagnosis Date   • Arthritis    • Exercise involving walking     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[address], "very active" per pt   • History of basal cell cancer    • Hyperlipidemia    • Hypertension     Sees Dr Derick Ta [facility] cardio/Echo 1/17   • Osteoarthritis 6/5/2007   • Skin cancer 07/16/2015    right thigh   • Wears glasses        PAST SURGICAL HISTORY:  Past Surgical History:   Procedure Laterality Date   • FLAP LOCAL HEAD / NECK Bilateral 1/21/2022    Procedure: EXCISION OF LEFT EAR & RIGHT EYEBROW SKIN CANCER W/LOCAL FLAP;  Surgeon: Namrata Hoang MD;  [facility];  Service: Plastics   • MI FULL THICK 2011 [facility] NOS,EAR,LID <20 SQCM Left 1/21/2022    Procedure: SKIN GRAFT;  Surgeon: Namrata Hoang MD;  Location: [facility];  Service: Plastics   • SKIN BIOPSY      local to remove skin cancer   • TONSILLECTOMY     • WISDOM TOOTH EXTRACTION         FAMILY HISTORY:  Family History   Problem Relation Age of Onset   • Hypertension Mother    • Hypertension Father    • Hypertension Sister    • No Known Problems Maternal Grandmother    • No Known Problems Maternal Grandfather    • No Known Problems Paternal Grandmother    • No Known Problems Paternal Grandfather    • Hypertension Brother    • No Known Problems Son    • No Known Problems Son    • Breast cancer Neg Hx        SOCIAL HISTORY:  Social History     Tobacco Use   • Smoking status: Former Smoker   • Smokeless tobacco: Never Used   • Tobacco comment: "I smoked in college, long ago  ; never a smoker & no passive smoke exposure as per [employer]   Vaping Use   • Vaping Use: Never used   Substance Use Topics   • Alcohol use: Yes     Comment: "rarely"   • Drug use: No       MEDICATIONS:    Current Outpatient Medications:   •  amLODIPine (NORVASC) 2 5 mg tablet, Take 2 5 mg by mouth daily, Disp: , Rfl:   •  Co Q-10 50 MG, Take by mouth daily, Disp: , Rfl:   •  rosuvastatin (CRESTOR) 5 mg tablet, , Disp: , Rfl:   •  losartan (COZAAR) 50 mg tablet, Take 1 tablet (50 mg total) by mouth daily, Disp: 30 tablet, Rfl: 0    ALLERGIES:  No Known Allergies    REVIEW OF SYSTEMS:  Pertinent items are noted in HPI  A comprehensive review of systems was negative      LABS:  HgA1c: No results found for: HGBA1C  BMP:   Lab Results   Component Value Date    CALCIUM 9 3 04/23/2022    K 4 2 04/23/2022    CO2 30 04/23/2022     (H) 04/23/2022    BUN 20 04/23/2022    CREATININE 0 68 04/23/2022           _____________________________________________________  PHYSICAL EXAMINATION:  Vital signs: /80   Ht 5' 3" (1 6 m)   Wt 58 6 kg (129 lb 3 2 oz)   BMI 22 89 kg/m²   General: well developed and well nourished, alert, oriented times 3 and appears comfortable  Psychiatric: Normal  HEENT: Trachea Midline, No torticollis  Cardiovascular: No discernable arrhythmia  Pulmonary: No wheezing or stridor  Abdomen: No rebound or guarding  Extremities: No peripheral edema  Skin: No masses, erythema, lacerations, fluctation, ulcerations  Neurovascular: Sensation Intact to the Median, Ulnar, Radial Nerve, Motor Intact to the Median, Ulnar, Radial Nerve and Pulses Intact    MUSCULOSKELETAL EXAMINATION:  De Quervain's Tenosynovitis Exam:  left side    Positive tender to palpation over 1st dorsal extensor compartment   Positive palpable nodule  Negative crepitus over 1st dorsal extensor compartment   Positive Finkelstein's test    Positive pain with resisted abduction of the thumb      _____________________________________________________  STUDIES REVIEWED:  No Studies to review      PROCEDURES PERFORMED:  Procedures  No Procedures performed today     Scribe Attestation    I,:  Chang Feliciano PA-C am acting as a scribe while in the presence of the attending physician :       I,:  Venecia Hong MD personally performed the services described in this documentation    as scribed in my presence :             Baljinder Urbina,:  Chang Feliciano PA-C am acting as a scribe while in the presence of the attending physician :       I,:  Venecia Hong MD personally performed the services described in this documentation    as scribed in my presence :

## 2022-11-01 ENCOUNTER — OFFICE VISIT (OUTPATIENT)
Dept: FAMILY MEDICINE CLINIC | Facility: CLINIC | Age: 67
End: 2022-11-01

## 2022-11-01 VITALS
HEIGHT: 63 IN | RESPIRATION RATE: 16 BRPM | DIASTOLIC BLOOD PRESSURE: 80 MMHG | WEIGHT: 129 LBS | BODY MASS INDEX: 22.86 KG/M2 | HEART RATE: 56 BPM | SYSTOLIC BLOOD PRESSURE: 136 MMHG | TEMPERATURE: 98.1 F | OXYGEN SATURATION: 97 %

## 2022-11-01 DIAGNOSIS — I10 ESSENTIAL HYPERTENSION: ICD-10-CM

## 2022-11-01 DIAGNOSIS — E78.2 MIXED HYPERLIPIDEMIA: Primary | ICD-10-CM

## 2022-11-01 DIAGNOSIS — I51.7 LEFT VENTRICULAR HYPERTROPHY BY ELECTROCARDIOGRAM: ICD-10-CM

## 2022-11-01 DIAGNOSIS — Z12.11 ENCOUNTER FOR SCREENING FOR MALIGNANT NEOPLASM OF COLON: ICD-10-CM

## 2022-11-01 DIAGNOSIS — M65.4 DE QUERVAIN'S TENOSYNOVITIS, LEFT: ICD-10-CM

## 2022-11-01 PROBLEM — M25.562 CHRONIC PAIN OF LEFT KNEE: Status: RESOLVED | Noted: 2019-12-17 | Resolved: 2022-11-01

## 2022-11-01 PROBLEM — G89.29 CHRONIC PAIN OF LEFT KNEE: Status: RESOLVED | Noted: 2019-12-17 | Resolved: 2022-11-01

## 2022-11-01 RX ORDER — ROSUVASTATIN CALCIUM 5 MG/1
5 TABLET, COATED ORAL DAILY
Qty: 90 TABLET | Refills: 1 | Status: SHIPPED | OUTPATIENT
Start: 2022-11-01

## 2022-11-01 RX ORDER — LOSARTAN POTASSIUM 50 MG/1
50 TABLET ORAL DAILY
Qty: 90 TABLET | Refills: 1 | Status: SHIPPED | OUTPATIENT
Start: 2022-11-01

## 2022-11-01 RX ORDER — AMLODIPINE BESYLATE 5 MG/1
5 TABLET ORAL DAILY
Qty: 90 TABLET | Refills: 1 | Status: SHIPPED | OUTPATIENT
Start: 2022-11-01

## 2022-11-01 RX ORDER — AMLODIPINE BESYLATE 2.5 MG/1
2.5 TABLET ORAL DAILY
Qty: 90 TABLET | Refills: 1 | Status: SHIPPED | OUTPATIENT
Start: 2022-11-01 | End: 2022-11-01 | Stop reason: SDUPTHER

## 2022-11-01 NOTE — ASSESSMENT & PLAN NOTE
She continues in pain  Symptoms are not very well controlled  Continue to follow-up with hand surgeon for possible surgery

## 2022-11-01 NOTE — ASSESSMENT & PLAN NOTE
Not well controlled her last LDL was elevated in April  She has been taking her Crestor 5 mg since then  Continue with low-fat diet and regular exercise  I am going to check her fasting lipid profile

## 2022-11-01 NOTE — PROGRESS NOTES
Name: Bean Cortés      :       MRN: [id number]  Encounter Provider: Francine Crump MD  Encounter Date: 2022   Encounter department: [address] [place] [address]  Mixed hyperlipidemia  Assessment & Plan:  Not well controlled her last LDL was elevated in April  She has been taking her Crestor 5 mg since then  Continue with low-fat diet and regular exercise  I am going to check her fasting lipid profile  Orders:  -     rosuvastatin (CRESTOR) 5 mg tablet; Take 1 tablet (5 mg total) by mouth daily  -     CBC and differential; Future  -     Lipid Panel with Direct LDL reflex; Future  -     Comprehensive metabolic panel; Future  -     TSH, 3rd generation with Free T4 reflex; Future    2  Essential hypertension  Assessment & Plan:  Not very well controlled on the upper limit of normal value  I am going to increase her Norvasc to 5 mg daily  It was discussed about possible side effects  She is to continue to monitor her blood pressure  She has an appointment to see her cardiologist for follow-up in January  Orders:  -     losartan (COZAAR) 50 mg tablet; Take 1 tablet (50 mg total) by mouth daily  -     amLODIPine (NORVASC) 5 mg tablet; Take 1 tablet (5 mg total) by mouth daily  -     CBC and differential; Future  -     Lipid Panel with Direct LDL reflex; Future  -     Comprehensive metabolic panel; Future  -     TSH, 3rd generation with Free T4 reflex; Future    3  De Quervain's tenosynovitis, left  Assessment & Plan:  She continues in pain  Symptoms are not very well controlled  Continue to follow-up with hand surgeon for possible surgery  4  Left ventricular hypertrophy by electrocardiogram  Assessment & Plan:  Findings on EKG but she is asymptomatic  She also had an echocardiogram in January that showed normal wall thickness of the left ventricle        5  Encounter for screening for malignant neoplasm of colon  -     Cologuard        Depression Screening and Follow-up Plan: Patient was screened for depression during today's encounter  They screened negative with a PHQ-2 score of 0  Subjective     She is here today for follow-up multiple medical problems  She has been taking her medications  She denies any side effects from her medications  She has been taking Crestor 5 mg daily in addition to Co Q10  She denies any significant side effects  Her last blood work was done in April showed elevated LDL  She was seen by cardiologist since then for abnormal EKG that showed possible left ventricular hypertrophy  She denies any chest pain or shortness of breath  She continues to have problems with her left wrist and was diagnosed with de Quervain  Plan to have a surgery done in the near future    Review of Systems   Constitutional: Negative for chills and fever  HENT: Negative for trouble swallowing  Eyes: Negative for visual disturbance  Respiratory: Negative for cough and shortness of breath  Cardiovascular: Negative for chest pain and palpitations  Gastrointestinal: Negative for abdominal pain  Endocrine: Negative for cold intolerance and heat intolerance  Genitourinary: Negative for difficulty urinating and dysuria  Musculoskeletal: Negative for gait problem  Left wrist pain   Skin: Negative for rash  Neurological: Negative for seizures  Hematological: Negative for adenopathy         Past Medical History:   Diagnosis Date   • Arthritis    • Exercise involving walking     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[address], "very active" per pt   • History of basal cell cancer    • Hyperlipidemia    • Hypertension     Sees Dr Prince Buck [facility] cardio/Echo 1/17   • Osteoarthritis 6/5/2007   • Skin cancer 07/16/2015    right thigh   • Wears glasses      Past Surgical History:   Procedure Laterality Date   • FLAP LOCAL HEAD / NECK Bilateral 1/21/2022    Procedure: EXCISION OF LEFT EAR & RIGHT EYEBROW SKIN CANCER W/LOCAL FLAP; Surgeon: Matthew Rai MD;  [facility];  Service: Plastics   • NM FULL THICK [address] NOS,EAR,LID <20 SQCM Left 1/21/2022    Procedure: SKIN GRAFT;  Surgeon: Matthew Rai MD;  [facility];  Service: Plastics   • SKIN BIOPSY      local to remove skin cancer   • TONSILLECTOMY     • WISDOM TOOTH EXTRACTION       Family History   Problem Relation Age of Onset   • Hypertension Mother    • Hypertension Father    • Hypertension Sister    • No Known Problems Maternal Grandmother    • No Known Problems Maternal Grandfather    • No Known Problems Paternal Grandmother    • No Known Problems Paternal Grandfather    • Hypertension Brother    • No Known Problems Son    • No Known Problems Son    • Breast cancer Neg Hx      Social History     Socioeconomic History   • Marital status: /Civil Union     Spouse name: None   • Number of children: None   • Years of education: None   • Highest education level: None   Occupational History   • None   Tobacco Use   • Smoking status: Former Smoker   • Smokeless tobacco: Never Used   • Tobacco comment: "I smoked in college, long ago  ; never a smoker & no passive smoke exposure as per [employer]   Vaping Use   • Vaping Use: Never used   Substance and Sexual Activity   • Alcohol use: Yes     Comment: "rarely"   • Drug use: No   • Sexual activity: None     Comment: defer   Other Topics Concern   • None   Social History Narrative   • None     Social Determinants of Health     Financial Resource Strain: Not on file   Food Insecurity: Not on file   Transportation Needs: Not on file   Physical Activity: Not on file   Stress: Not on file   Social Connections: Not on file   Intimate Partner Violence: Not on file   Housing Stability: Not on file     Current Outpatient Medications on File Prior to Visit   Medication Sig   • Co Q-10 50 MG Take by mouth daily   • [DISCONTINUED] amLODIPine (NORVASC) 2 5 mg tablet Take 2 5 mg by mouth daily   • [DISCONTINUED] losartan (COZAAR) 50 mg tablet Take 1 tablet (50 mg total) by mouth daily   • [DISCONTINUED] rosuvastatin (CRESTOR) 5 mg tablet Take 5 mg by mouth daily     No Known Allergies  Immunization History   Administered Date(s) Administered   • COVID-19 MODERNA VACC 0 5 ML IM 01/25/2021, 02/25/2021   • Tdap 08/06/2020   • influenza, injectable, quadrivalent 10/11/2020       Objective     /80 (BP Location: Left arm, Patient Position: Sitting, Cuff Size: Adult)   Pulse 56   Temp 98 1 °F (36 7 °C) (Tympanic)   Resp 16   Ht 5' 3" (1 6 m)   Wt 58 5 kg (129 lb)   SpO2 97%   BMI 22 85 kg/m²     Physical Exam  Vitals and nursing note reviewed  Constitutional:       Appearance: She is well-developed  HENT:      Head: Normocephalic and atraumatic  Eyes:      Pupils: Pupils are equal, round, and reactive to light  Cardiovascular:      Rate and Rhythm: Normal rate and regular rhythm  Heart sounds: Normal heart sounds  Pulmonary:      Effort: Pulmonary effort is normal       Breath sounds: Normal breath sounds  Abdominal:      General: Bowel sounds are normal       Palpations: Abdomen is soft  Musculoskeletal:         General: Normal range of motion  Cervical back: Normal range of motion and neck supple  Lymphadenopathy:      Cervical: No cervical adenopathy  Skin:     General: Skin is warm  Neurological:      Mental Status: She is alert and oriented to person, place, and time  Cranial Nerves: No cranial nerve deficit         King Jose MD

## 2022-11-01 NOTE — ASSESSMENT & PLAN NOTE
Findings on EKG but she is asymptomatic  She also had an echocardiogram in January that showed normal wall thickness of the left ventricle

## 2022-11-01 NOTE — ASSESSMENT & PLAN NOTE
Not very well controlled on the upper limit of normal value  I am going to increase her Norvasc to 5 mg daily  It was discussed about possible side effects  She is to continue to monitor her blood pressure  She has an appointment to see her cardiologist for follow-up in January

## 2022-11-05 ENCOUNTER — APPOINTMENT (OUTPATIENT)
Dept: LAB | Age: 67
End: 2022-11-05

## 2022-11-05 DIAGNOSIS — E78.2 MIXED HYPERLIPIDEMIA: ICD-10-CM

## 2022-11-05 DIAGNOSIS — I10 ESSENTIAL HYPERTENSION: ICD-10-CM

## 2022-11-06 LAB
ALBUMIN SERPL BCP-MCNC: 3.3 G/DL (ref 3.5–5)
ALP SERPL-CCNC: 75 U/L (ref 46–116)
ALT SERPL W P-5'-P-CCNC: 28 U/L (ref 12–78)
ANION GAP SERPL CALCULATED.3IONS-SCNC: 3 MMOL/L (ref 4–13)
AST SERPL W P-5'-P-CCNC: 22 U/L (ref 5–45)
BASOPHILS # BLD AUTO: 0.02 THOUSANDS/ÂΜL (ref 0–0.1)
BASOPHILS NFR BLD AUTO: 1 % (ref 0–1)
BILIRUB SERPL-MCNC: 0.45 MG/DL (ref 0.2–1)
BUN SERPL-MCNC: 14 MG/DL (ref 5–25)
CALCIUM ALBUM COR SERPL-MCNC: 9.5 MG/DL (ref 8.3–10.1)
CALCIUM SERPL-MCNC: 8.9 MG/DL (ref 8.3–10.1)
CHLORIDE SERPL-SCNC: 110 MMOL/L (ref 96–108)
CHOLEST SERPL-MCNC: 189 MG/DL
CO2 SERPL-SCNC: 28 MMOL/L (ref 21–32)
CREAT SERPL-MCNC: 0.62 MG/DL (ref 0.6–1.3)
EOSINOPHIL # BLD AUTO: 0.21 THOUSAND/ÂΜL (ref 0–0.61)
EOSINOPHIL NFR BLD AUTO: 5 % (ref 0–6)
ERYTHROCYTE [DISTWIDTH] IN BLOOD BY AUTOMATED COUNT: 12.4 % (ref 11.6–15.1)
GFR SERPL CREATININE-BSD FRML MDRD: 92 ML/MIN/1.73SQ M
GLUCOSE P FAST SERPL-MCNC: 90 MG/DL (ref 65–99)
HCT VFR BLD AUTO: 39.9 % (ref 34.8–46.1)
HDLC SERPL-MCNC: 92 MG/DL
HGB BLD-MCNC: 12.8 G/DL (ref 11.5–15.4)
IMM GRANULOCYTES # BLD AUTO: 0.01 THOUSAND/UL (ref 0–0.2)
IMM GRANULOCYTES NFR BLD AUTO: 0 % (ref 0–2)
LDLC SERPL CALC-MCNC: 86 MG/DL (ref 0–100)
LYMPHOCYTES # BLD AUTO: 1.38 THOUSANDS/ÂΜL (ref 0.6–4.47)
LYMPHOCYTES NFR BLD AUTO: 34 % (ref 14–44)
MCH RBC QN AUTO: 29.8 PG (ref 26.8–34.3)
MCHC RBC AUTO-ENTMCNC: 32.1 G/DL (ref 31.4–37.4)
MCV RBC AUTO: 93 FL (ref 82–98)
MONOCYTES # BLD AUTO: 0.36 THOUSAND/ÂΜL (ref 0.17–1.22)
MONOCYTES NFR BLD AUTO: 9 % (ref 4–12)
NEUTROPHILS # BLD AUTO: 2.11 THOUSANDS/ÂΜL (ref 1.85–7.62)
NEUTS SEG NFR BLD AUTO: 51 % (ref 43–75)
NRBC BLD AUTO-RTO: 0 /100 WBCS
PLATELET # BLD AUTO: 314 THOUSANDS/UL (ref 149–390)
PMV BLD AUTO: 9.9 FL (ref 8.9–12.7)
POTASSIUM SERPL-SCNC: 3.9 MMOL/L (ref 3.5–5.3)
PROT SERPL-MCNC: 6.7 G/DL (ref 6.4–8.4)
RBC # BLD AUTO: 4.29 MILLION/UL (ref 3.81–5.12)
SODIUM SERPL-SCNC: 141 MMOL/L (ref 135–147)
TRIGL SERPL-MCNC: 54 MG/DL
TSH SERPL DL<=0.05 MIU/L-ACNC: 0.8 UIU/ML (ref 0.45–4.5)
WBC # BLD AUTO: 4.09 THOUSAND/UL (ref 4.31–10.16)

## 2022-11-14 ENCOUNTER — OFFICE VISIT (OUTPATIENT)
Dept: OBGYN CLINIC | Facility: CLINIC | Age: 67
End: 2022-11-14

## 2022-11-14 VITALS
DIASTOLIC BLOOD PRESSURE: 78 MMHG | WEIGHT: 128.4 LBS | HEIGHT: 63 IN | BODY MASS INDEX: 22.75 KG/M2 | SYSTOLIC BLOOD PRESSURE: 154 MMHG

## 2022-11-14 DIAGNOSIS — Z11.51 SCREENING FOR HPV (HUMAN PAPILLOMAVIRUS): ICD-10-CM

## 2022-11-14 DIAGNOSIS — N95.0 POST-MENOPAUSAL BLEEDING: Primary | ICD-10-CM

## 2022-11-14 LAB
BACTERIA UR QL AUTO: ABNORMAL /HPF
BILIRUB UR QL STRIP: NEGATIVE
CLARITY UR: CLEAR
COLOR UR: ABNORMAL
GLUCOSE UR STRIP-MCNC: NEGATIVE MG/DL
HGB UR QL STRIP.AUTO: ABNORMAL
KETONES UR STRIP-MCNC: ABNORMAL MG/DL
LEUKOCYTE ESTERASE UR QL STRIP: NEGATIVE
MUCOUS THREADS UR QL AUTO: ABNORMAL
NITRITE UR QL STRIP: NEGATIVE
NON-SQ EPI CELLS URNS QL MICRO: ABNORMAL /HPF
PH UR STRIP.AUTO: 6 [PH]
PROT UR STRIP-MCNC: NEGATIVE MG/DL
RBC #/AREA URNS AUTO: ABNORMAL /HPF
SL AMB  POCT GLUCOSE, UA: NEGATIVE
SL AMB LEUKOCYTE ESTERASE,UA: ABNORMAL
SL AMB POCT BILIRUBIN,UA: NEGATIVE
SL AMB POCT BLOOD,UA: ABNORMAL
SL AMB POCT CLARITY,UA: CLEAR
SL AMB POCT COLOR,UA: ABNORMAL
SL AMB POCT KETONES,UA: NEGATIVE
SL AMB POCT NITRITE,UA: NEGATIVE
SL AMB POCT PH,UA: 5
SL AMB POCT SPECIFIC GRAVITY,UA: 1.01
SL AMB POCT URINE PROTEIN: NEGATIVE
SL AMB POCT UROBILINOGEN: 0.2
SP GR UR STRIP.AUTO: 1.02 (ref 1–1.03)
UROBILINOGEN UR STRIP-ACNC: <2 MG/DL
WBC #/AREA URNS AUTO: ABNORMAL /HPF

## 2022-11-14 RX ORDER — AMOXICILLIN 500 MG/1
CAPSULE ORAL
COMMUNITY
Start: 2022-11-09

## 2022-11-14 NOTE — PROGRESS NOTES
Moni Dalton  1955    S:  75 y o  female with c/o episode of vaginal bleeding x 2 days  She reports an episode of bright red blood present with wiping 4 days ago  This occurred with each void, with only a drop of blood noted on underwear  She reports slight dysuria, but wondered if this was caused by a using a new fragranced soap (had experienced vulvar irritation and dysuria when using previously)  Not associated with pelvic pain, cramping, discharge, itching, odor, constipation, urinary urgency, or frequency  Unsure of when she went through menopause - guess early to mid 46s  Menarche age 14  Pregnancy hx   Nonsmoker  FH Mother with uterine cancer (76s), ovarian CA (46s)     Review of Systems   Respiratory: Negative  Cardiovascular: Negative  Gastrointestinal: + hemorrhoids  Negative for abd pain, flank pain, constipation and diarrhea  Genitourinary: +dysuria, vaginal bleeding  Negative for urinary urgency, frequency, flank pain, pelvic pain, vaginal discharge, itching or odor  O:  /78 (BP Location: Left arm, Patient Position: Sitting, Cuff Size: Standard)   Ht 5' 2 5" (1 588 m)   Wt 58 2 kg (128 lb 6 4 oz)   BMI 23 11 kg/m²      Urine dip:    Component 22  8:30 AM    LEUKOCYTE ESTERASE,UA trace    NITRITE,UA negative    SL AMB POCT UROBILINOGEN 0 2    POCT URINE PROTEIN negative     PH,UA 5    BLOOD,UA trace    SPECIFIC GRAVITY,UA 1 015    KETONES,UA negative    BILIRUBIN,UA negative    GLUCOSE, UA negative     COLOR,UA Straw    CLARITY,UA Clear        She appears well and is in no distress  Normocephalic, atraumatic  Normal respiratory effort  Abdomen is soft and nontender  External genitals are normal without lesions or rashes  Vagina is without discharge or bleeding  Cervix is without lesions or discharge  No CMT     Uterus is nontender, no masses  Adnexa are nontender, no pelvic masses appreciated  + external hemorrhoids - non thrombosed, no obvious signs of bleeding  No focal neurological deficits  Normal mood, affect, and behavior  Bleeding source not identified  A/P:    1  Post-menopausal bleeding    - US pelvis complete w transvaginal; Future  - Urinalysis with microscopic  - Urine culture  - POCT urine dip  - Liquid-based pap, screening  - HPV High Risk    - Reviewed with patient no obvious source of bleed  Hemorrhoids noted, with no active bleeding or thrombosis  Will check UA and culture and pap smear as well (last pap 10 years ago, normal, no hx of abn)    -Discussed potential GYN etiology, evaluation, management of postmenopausal bleeding  Most common etiology atrophy, also possibly structural ie polyps, however always possible that hyperplasia or malignancy is the cause  Given possibility of malignancy, further evaluation necessary  Discussed risks and benefits of endometrial biopsy vs  Transvaginal ultrasound for evaluation of endometrial lining  Pt elects for TVUS  -Discussed, should ultrasound demonstrate thin endometrial lining, evaluation would be complete and reassuring that atrophy was the cause of this bleeding, but with thickened lining, we would recommend biopsy for evaluation         2  Screening for HPV (human papillomavirus)    - Liquid-based pap, screening  - HPV High Risk

## 2022-11-15 ENCOUNTER — TELEPHONE (OUTPATIENT)
Dept: OBGYN CLINIC | Facility: CLINIC | Age: 67
End: 2022-11-15

## 2022-11-15 ENCOUNTER — HOSPITAL ENCOUNTER (OUTPATIENT)
Dept: ULTRASOUND IMAGING | Facility: HOSPITAL | Age: 67
Discharge: HOME | End: 2022-11-15

## 2022-11-15 DIAGNOSIS — N95.0 POST-MENOPAUSAL BLEEDING: ICD-10-CM

## 2022-11-15 LAB — BACTERIA UR CULT: NORMAL

## 2022-11-15 NOTE — TELEPHONE ENCOUNTER
Pt see Essence Peters,  pls call pt as she saw results in her [facility] that were flagged & concerned,  Thanks

## 2022-11-17 LAB
HPV HR 12 DNA CVX QL NAA+PROBE: NEGATIVE
HPV16 DNA CVX QL NAA+PROBE: NEGATIVE
HPV18 DNA CVX QL NAA+PROBE: NEGATIVE

## 2022-11-18 LAB
LAB AP GYN PRIMARY INTERPRETATION: NORMAL
Lab: NORMAL

## 2022-11-22 ENCOUNTER — TELEPHONE (OUTPATIENT)
Dept: OBGYN CLINIC | Facility: CLINIC | Age: 67
End: 2022-11-22

## 2022-11-22 ENCOUNTER — TELEPHONE (OUTPATIENT)
Dept: FAMILY MEDICINE CLINIC | Facility: CLINIC | Age: 67
End: 2022-11-22

## 2022-11-22 DIAGNOSIS — I10 ESSENTIAL HYPERTENSION: Primary | ICD-10-CM

## 2022-11-22 RX ORDER — AMLODIPINE BESYLATE 2.5 MG/1
2.5 TABLET ORAL DAILY
Qty: 30 TABLET | Refills: 0 | Status: SHIPPED | OUTPATIENT
Start: 2022-11-22

## 2022-11-22 NOTE — TELEPHONE ENCOUNTER
Patient called and said at last visit Dr Britton Primes increased her amlodipine from 2 5mg to 5mg and she cannot tolerate the 5mg  She is dizzy and does not feel well on it      She cannot cut the 5mg in half either so is asking what to do and will need a new script for 2 5mg

## 2022-11-22 NOTE — TELEPHONE ENCOUNTER
----- Message from Ramón Vu PA-C sent at 11/21/2022  2:39 PM EST -----  Will you please call Tomás Jaimes and help to get her scheduled for an endometrial biopsy with me  Pelvic ultrasound showed a fibroid, which are benign growths, however, this obscured most of her uterine lining  The portion the lining that was able to be visualized did not appear to be overgrown, but because of this and episode of  bleeding, I would like to have her come in for tissue sampling so we can rule out any other causes for bleeding  We did discuss this procedure at her last visit  She can premedicate with Advil or tylenol 1 hr prior to procedure       Please let me know if she has questions,  [facility]

## 2022-12-01 ENCOUNTER — OFFICE VISIT (OUTPATIENT)
Dept: OBGYN CLINIC | Facility: CLINIC | Age: 67
End: 2022-12-01

## 2022-12-01 VITALS — WEIGHT: 129.8 LBS | SYSTOLIC BLOOD PRESSURE: 158 MMHG | BODY MASS INDEX: 23.36 KG/M2 | DIASTOLIC BLOOD PRESSURE: 72 MMHG

## 2022-12-01 DIAGNOSIS — N95.0 POST-MENOPAUSAL BLEEDING: Primary | ICD-10-CM

## 2022-12-01 NOTE — PROGRESS NOTES
Endometrial biopsy    Date/Time: 12/1/2022 11:10 AM  Performed by: Murphy Pelayo PA-C  Authorized by: Murphy Pelayo PA-C   Universal Protocol:  Consent: Verbal consent obtained  Written consent obtained  Risks and benefits: risks, benefits and alternatives were discussed  Consent given by: patient  Time out: Immediately prior to procedure a "time out" was called to verify the correct patient, procedure, equipment, support staff and site/side marked as required  Patient understanding: patient states understanding of the procedure being performed  Patient consent: the patient's understanding of the procedure matches consent given  Procedure consent: procedure consent matches procedure scheduled  Relevant documents: relevant documents present and verified  Test results: test results available and properly labeled  Radiology Images displayed and confirmed  If images not available, report reviewed: imaging studies available  Required items: required blood products, implants, devices, and special equipment available  Patient identity confirmed: verbally with patient      Indication:     Indications: Post-menopausal bleeding      Chronicity of post-menopausal bleeding:  New    Progression of post-menopausal bleeding:  Resolved  Procedure:     Procedure: endometrial biopsy with Pipelle      A bivalve speculum was placed in the vagina: yes      Cervix cleaned and prepped: yes      Uterus sounded: yes      Uterus sound depth (cm):  9    Specimen collected: specimen collected and sent to pathology      Patient tolerated procedure well with no complications: yes    Findings:     Cervix: normal      Adnexa: normal    Comments:     Procedure comments:  Segundo Meier notes she is anxious today  Unfortunately following this procedure she is going to have 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   Admittedly nervous regarding this procedure but does desire to continue with procedure today, noting she just needs to make sure that bleeding was not due to a concerning reason  We reviewed US today showing large fibroid and that the portions of the lining that were able to be visualized were thin  Discussed that bleeding was likely  secondary to atrophy and gave option to monitor for subsequent bleeding episodes  She declined this, noting she will feel better confirming benign reason for bleeding  Biopsy performed without complication and was well tolerated  Advised not to open results, but rather wait for call if this will heighten anxiety  Discussed lab turn around time  Expectant management for sx following procedure given  Precautions reviewed

## 2022-12-17 DIAGNOSIS — I10 ESSENTIAL HYPERTENSION: ICD-10-CM

## 2022-12-19 RX ORDER — AMLODIPINE BESYLATE 2.5 MG/1
TABLET ORAL
Qty: 30 TABLET | Refills: 3 | Status: SHIPPED | OUTPATIENT
Start: 2022-12-19

## 2022-12-27 ENCOUNTER — TELEPHONE (OUTPATIENT)
Dept: FAMILY MEDICINE CLINIC | Facility: CLINIC | Age: 67
End: 2022-12-27

## 2022-12-27 NOTE — LETTER
Date: 12/28/2022    To whom it may concern: This is to certify that Nadine Parrish has been under my care for the following diagnosis: chronic back pain and herniated discs which she is not able to sit/stand for prolonged periods of time    I feel that Nadine Parrish is unable to serve on [employer] at this time for the above mentioned medical reasons                    Sincerely,  Ryland Reddy MD

## 2023-01-04 ENCOUNTER — VBI (OUTPATIENT)
Dept: ADMINISTRATIVE | Facility: OTHER | Age: 68
End: 2023-01-04

## 2023-04-24 ENCOUNTER — RA CDI HCC (OUTPATIENT)
Dept: OTHER | Facility: HOSPITAL | Age: 68
End: 2023-04-24

## 2023-04-24 NOTE — PROGRESS NOTES
[facility] 74  coding opportunities       Chart reviewed, no opportunity found: CHART REVIEWED, NO OPPORTUNITY FOUND   This is a reminder to address ALL HCC (risk adjustment) codes as found on active problem list for 2023 as patient scores reset to zero KYLIE         Patients Insurance        Commercial Insurance: [address] [place] [address]

## 2023-06-12 ENCOUNTER — TELEPHONE (OUTPATIENT)
Dept: FAMILY MEDICINE CLINIC | Facility: CLINIC | Age: 68
End: 2023-06-12

## 2023-06-12 ENCOUNTER — APPOINTMENT (OUTPATIENT)
Dept: LAB | Facility: IMAGING CENTER | Age: 68
End: 2023-06-12
Payer: COMMERCIAL

## 2023-06-12 ENCOUNTER — OFFICE VISIT (OUTPATIENT)
Dept: FAMILY MEDICINE CLINIC | Facility: CLINIC | Age: 68
End: 2023-06-12
Payer: COMMERCIAL

## 2023-06-12 VITALS
HEART RATE: 94 BPM | WEIGHT: 127 LBS | BODY MASS INDEX: 22.5 KG/M2 | TEMPERATURE: 98.1 F | SYSTOLIC BLOOD PRESSURE: 132 MMHG | DIASTOLIC BLOOD PRESSURE: 70 MMHG | HEIGHT: 63 IN | OXYGEN SATURATION: 99 % | RESPIRATION RATE: 16 BRPM

## 2023-06-12 DIAGNOSIS — E55.9 VITAMIN D INSUFFICIENCY: ICD-10-CM

## 2023-06-12 DIAGNOSIS — Z12.11 ENCOUNTER FOR COLORECTAL CANCER SCREENING: ICD-10-CM

## 2023-06-12 DIAGNOSIS — I10 ESSENTIAL HYPERTENSION: ICD-10-CM

## 2023-06-12 DIAGNOSIS — Z12.12 ENCOUNTER FOR COLORECTAL CANCER SCREENING: ICD-10-CM

## 2023-06-12 DIAGNOSIS — K59.04 CHRONIC IDIOPATHIC CONSTIPATION: Primary | ICD-10-CM

## 2023-06-12 DIAGNOSIS — E78.2 MIXED HYPERLIPIDEMIA: ICD-10-CM

## 2023-06-12 DIAGNOSIS — Z00.00 HEALTHCARE MAINTENANCE: ICD-10-CM

## 2023-06-12 LAB
25(OH)D3 SERPL-MCNC: 79.5 NG/ML (ref 30–100)
ALBUMIN SERPL BCP-MCNC: 3.9 G/DL (ref 3.5–5)
ALP SERPL-CCNC: 71 U/L (ref 46–116)
ALT SERPL W P-5'-P-CCNC: 38 U/L (ref 12–78)
ANION GAP SERPL CALCULATED.3IONS-SCNC: 2 MMOL/L (ref 4–13)
AST SERPL W P-5'-P-CCNC: 33 U/L (ref 5–45)
BASOPHILS # BLD AUTO: 0.03 THOUSANDS/ÂΜL (ref 0–0.1)
BASOPHILS NFR BLD AUTO: 1 % (ref 0–1)
BILIRUB SERPL-MCNC: 0.53 MG/DL (ref 0.2–1)
BUN SERPL-MCNC: 19 MG/DL (ref 5–25)
CALCIUM SERPL-MCNC: 9.5 MG/DL (ref 8.3–10.1)
CHLORIDE SERPL-SCNC: 107 MMOL/L (ref 96–108)
CHOLEST SERPL-MCNC: 207 MG/DL
CO2 SERPL-SCNC: 29 MMOL/L (ref 21–32)
CREAT SERPL-MCNC: 0.6 MG/DL (ref 0.6–1.3)
EOSINOPHIL # BLD AUTO: 0.21 THOUSAND/ÂΜL (ref 0–0.61)
EOSINOPHIL NFR BLD AUTO: 5 % (ref 0–6)
ERYTHROCYTE [DISTWIDTH] IN BLOOD BY AUTOMATED COUNT: 12.3 % (ref 11.6–15.1)
GFR SERPL CREATININE-BSD FRML MDRD: 93 ML/MIN/1.73SQ M
GLUCOSE P FAST SERPL-MCNC: 93 MG/DL (ref 65–99)
HCT VFR BLD AUTO: 42 % (ref 34.8–46.1)
HDLC SERPL-MCNC: 101 MG/DL
HGB BLD-MCNC: 13.2 G/DL (ref 11.5–15.4)
IMM GRANULOCYTES # BLD AUTO: 0.01 THOUSAND/UL (ref 0–0.2)
IMM GRANULOCYTES NFR BLD AUTO: 0 % (ref 0–2)
LDLC SERPL CALC-MCNC: 94 MG/DL (ref 0–100)
LYMPHOCYTES # BLD AUTO: 1.48 THOUSANDS/ÂΜL (ref 0.6–4.47)
LYMPHOCYTES NFR BLD AUTO: 36 % (ref 14–44)
MCH RBC QN AUTO: 29.5 PG (ref 26.8–34.3)
MCHC RBC AUTO-ENTMCNC: 31.4 G/DL (ref 31.4–37.4)
MCV RBC AUTO: 94 FL (ref 82–98)
MONOCYTES # BLD AUTO: 0.32 THOUSAND/ÂΜL (ref 0.17–1.22)
MONOCYTES NFR BLD AUTO: 8 % (ref 4–12)
NEUTROPHILS # BLD AUTO: 2.05 THOUSANDS/ÂΜL (ref 1.85–7.62)
NEUTS SEG NFR BLD AUTO: 50 % (ref 43–75)
NRBC BLD AUTO-RTO: 0 /100 WBCS
PLATELET # BLD AUTO: 342 THOUSANDS/UL (ref 149–390)
PMV BLD AUTO: 9.9 FL (ref 8.9–12.7)
POTASSIUM SERPL-SCNC: 4 MMOL/L (ref 3.5–5.3)
PROT SERPL-MCNC: 7.4 G/DL (ref 6.4–8.4)
RBC # BLD AUTO: 4.48 MILLION/UL (ref 3.81–5.12)
SODIUM SERPL-SCNC: 138 MMOL/L (ref 135–147)
TRIGL SERPL-MCNC: 60 MG/DL
TSH SERPL DL<=0.05 MIU/L-ACNC: 1.41 UIU/ML (ref 0.45–4.5)
WBC # BLD AUTO: 4.1 THOUSAND/UL (ref 4.31–10.16)

## 2023-06-12 PROCEDURE — 84443 ASSAY THYROID STIM HORMONE: CPT

## 2023-06-12 PROCEDURE — 80053 COMPREHEN METABOLIC PANEL: CPT

## 2023-06-12 PROCEDURE — 36415 COLL VENOUS BLD VENIPUNCTURE: CPT

## 2023-06-12 PROCEDURE — 80061 LIPID PANEL: CPT

## 2023-06-12 PROCEDURE — 99397 PER PM REEVAL EST PAT 65+ YR: CPT | Performed by: FAMILY MEDICINE

## 2023-06-12 PROCEDURE — 99214 OFFICE O/P EST MOD 30 MIN: CPT | Performed by: FAMILY MEDICINE

## 2023-06-12 PROCEDURE — 82306 VITAMIN D 25 HYDROXY: CPT

## 2023-06-12 PROCEDURE — 85025 COMPLETE CBC W/AUTO DIFF WBC: CPT

## 2023-06-12 RX ORDER — AMLODIPINE BESYLATE 2.5 MG/1
2.5 TABLET ORAL DAILY
Qty: 90 TABLET | Refills: 1 | Status: SHIPPED | OUTPATIENT
Start: 2023-06-12

## 2023-06-12 RX ORDER — LOSARTAN POTASSIUM 50 MG/1
50 TABLET ORAL DAILY
Qty: 90 TABLET | Refills: 1 | Status: SHIPPED | OUTPATIENT
Start: 2023-06-12 | End: 2023-06-12

## 2023-06-12 RX ORDER — LOSARTAN POTASSIUM 50 MG/1
50 TABLET ORAL 2 TIMES DAILY
COMMUNITY

## 2023-06-12 RX ORDER — ROSUVASTATIN CALCIUM 5 MG/1
5 TABLET, COATED ORAL DAILY
Qty: 90 TABLET | Refills: 1 | Status: SHIPPED | OUTPATIENT
Start: 2023-06-12

## 2023-06-12 NOTE — TELEPHONE ENCOUNTER
Pt aware I updated her medication list  Pt cardiologist increased her dose  Pt would like to know her lab results    Please advise

## 2023-06-12 NOTE — ASSESSMENT & PLAN NOTE
- labs from 11/05/2022 within normal limits  - advised patient to obtain routine blood work  - continue with crestor 5mg daily  - encouraged to continue with diet and exercise  - will continue to monitor

## 2023-06-12 NOTE — ASSESSMENT & PLAN NOTE
- patient experiencing constipation symptoms x 2 years  - has been taking metamucil, eating greens and drinking adequate water  - encouraged patient to continue with fiber intake and exercise  Recommended taking Miralax for symptom management  - referral to GI in place   Patient agreeable for colonoscopy  - will continue to monitor

## 2023-06-12 NOTE — ASSESSMENT & PLAN NOTE
- patient with a history of vitamin D insufficiency  - plan for repeat labs  - will continue to monitor

## 2023-06-12 NOTE — ASSESSMENT & PLAN NOTE
- referral to GI in place  - patient with chronic constipation symptoms  - educated on colonoscopy and cologuard  - will continue to monitor

## 2023-06-12 NOTE — ASSESSMENT & PLAN NOTE
- pressures stable today at 132/70  - encouraged to continue with diet and exercise  - continue with amlodipine and losartan  - patient has follow up with cardiology scheduled for August  - will continue to monitor

## 2023-06-12 NOTE — TELEPHONE ENCOUNTER
Patient said she was seen today and her summary says she takes losartan once a day  She has been taking twice a day per cardiology    Asking for someone to call her    [phone]

## 2023-06-12 NOTE — PROGRESS NOTES
Name: Vero Medel      : [id number]      MRN: [id number]  Encounter Provider: Sabine Wilcox MD  Encounter Date: 2023   Encounter department: [address] [place] [address]  Chronic idiopathic constipation  Assessment & Plan:  - patient experiencing constipation symptoms x 2 years  - has been taking metamucil, eating greens and drinking adequate water  - encouraged patient to continue with fiber intake and exercise  Recommended taking Miralax for symptom management  - referral to GI in place  Patient agreeable for colonoscopy  - will continue to monitor      2  Encounter for colorectal cancer screening  Assessment & Plan:  - referral to GI in place  - patient with chronic constipation symptoms  - educated on colonoscopy and cologuard  - will continue to monitor    Orders:  -     Ambulatory Referral to Gastroenterology; Future    3  Mixed hyperlipidemia  Assessment & Plan:  - labs from 2022 within normal limits  - advised patient to obtain routine blood work  - continue with crestor 5mg daily  - encouraged to continue with diet and exercise  - will continue to monitor    Orders:  -     CBC and differential; Future  -     Comprehensive metabolic panel; Future  -     Lipid Panel with Direct LDL reflex; Future  -     TSH, 3rd generation with Free T4 reflex; Future  -     rosuvastatin (CRESTOR) 5 mg tablet; Take 1 tablet (5 mg total) by mouth daily    4  Essential hypertension  Assessment & Plan:  - pressures stable today at 132/70  - encouraged to continue with diet and exercise  - continue with amlodipine and losartan  - patient has follow up with cardiology scheduled for August  - will continue to monitor    Orders:  -     CBC and differential; Future  -     Comprehensive metabolic panel; Future  -     Lipid Panel with Direct LDL reflex; Future  -     TSH, 3rd generation with Free T4 reflex; Future  -     amLODIPine (NORVASC) 2 5 mg tablet;  Take 1 tablet (2 5 mg total) by mouth daily  -     losartan (COZAAR) 50 mg tablet; Take 1 tablet (50 mg total) by mouth daily    5  Vitamin D insufficiency  Assessment & Plan:  - patient with a history of vitamin D insufficiency  - plan for repeat labs  - will continue to monitor    Orders:  -     Vitamin D 25 hydroxy; Future    6  Healthcare maintenance  Assessment & Plan:  Was discussed about immunizations, nutrition safety measures  She was given referral for colonoscopy  Subjective     ALONDRA is a 75year old female presenting with a history of HTN, HLD and melanoma presenting for routine follow up  She reports increased constipation symptoms that have been occurring over the past 2 years  She states she typically does not have a bowel movement for up to three days  On the third day, she will take a laxative for her symptoms with relief  She denies any black or bloody stools or severe abdominal pain  She reports taking Metamucil daily and drinking adequate amounts of water  Agreeable for referral to GI with colonoscopy  She states her wrist pain spontaneously improved and is no longer opting for surgical management  Labs reviewed with patient today  She denies any chest pain, shortness of breath, nausea or changes to her bladder function  She has a follow up appointment with dermatology next month, and follow up with cardiology in August     Review of Systems   Constitutional: Negative for chills and fever  HENT: Negative for ear pain and sore throat  Eyes: Negative for pain and visual disturbance  Respiratory: Negative for cough and shortness of breath  Cardiovascular: Negative for chest pain and palpitations  Gastrointestinal: Positive for constipation  Negative for abdominal pain, blood in stool, diarrhea, nausea and vomiting  Genitourinary: Negative for dysuria and hematuria  Musculoskeletal: Negative for arthralgias, back pain and myalgias  Skin: Negative for color change and rash     Neurological: Negative "for dizziness, syncope and light-headedness  All other systems reviewed and are negative        Past Medical History:   Diagnosis Date   • Arthritis    • Cancer [facility])    • Exercise involving walking     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[employer], \"very active\" per pt   • History of basal cell cancer    • Hyperlipidemia    • Hypertension     Sees Dr Sandra PUENTE cardio/Echo 1/17   • Osteoarthritis 06/05/2007   • Skin cancer 07/16/2015    right thigh   • Wears glasses      Past Surgical History:   Procedure Laterality Date   • FLAP LOCAL HEAD / NECK Bilateral 1/21/2022    Procedure: EXCISION OF LEFT EAR & RIGHT EYEBROW SKIN CANCER W/LOCAL FLAP;  Surgeon: Krissy Brito MD;  [facility];  Service: Plastics   • MO FTH/GFT FREE W/DIRECT CLOSURE N/E/E/L 20 SQ CM/< Left 1/21/2022    Procedure: SKIN GRAFT;  Surgeon: Krissy Brito MD;  Location: [facility];  Service: Plastics   • SKIN BIOPSY      local to remove skin cancer   • TONSILLECTOMY     • WISDOM TOOTH EXTRACTION       Family History   Problem Relation Age of Onset   • Hypertension Mother    • Uterine cancer Mother    • Hypertension Father    • Hypertension Sister    • Ovarian cancer Sister         diagnosed in her 52's   • Hypertension Brother    • No Known Problems Maternal Grandmother    • No Known Problems Maternal Grandfather    • No Known Problems Paternal Grandmother    • No Known Problems Paternal Grandfather    • No Known Problems Son    • No Known Problems Son    • Breast cancer Neg Hx      Social History     Socioeconomic History   • Marital status: /Civil Union     Spouse name: None   • Number of children: None   • Years of education: None   • Highest education level: None   Occupational History   • None   Tobacco Use   • Smoking status: Former   • Smokeless tobacco: Never   • Tobacco comments:     \"I smoked in college, long ago  ; never a smoker & no passive smoke exposure as per [employer]   Vaping Use   • Vaping Use: Never used " "  Substance and Sexual Activity   • Alcohol use: Not Currently     Comment: \"rarely\"   • Drug use: No   • Sexual activity: Not Currently     Partners: Male     Birth control/protection: Post-menopausal   Other Topics Concern   • None   Social History Narrative   • None     Social Determinants of Health     Financial Resource Strain: Not on file   Food Insecurity: Not on file   Transportation Needs: Not on file   Physical Activity: Not on file   Stress: Not on file   Social Connections: Not on file   Intimate Partner Violence: Not on file   Housing Stability: Not on file     Current Outpatient Medications on File Prior to Visit   Medication Sig   • Co Q-10 50 MG Take by mouth daily   • [DISCONTINUED] amLODIPine (NORVASC) 2 5 mg tablet TAKE ONE TABLET BY MOUTH EVERY DAY   • [DISCONTINUED] losartan (COZAAR) 50 mg tablet Take 1 tablet (50 mg total) by mouth daily   • [DISCONTINUED] rosuvastatin (CRESTOR) 5 mg tablet Take 1 tablet (5 mg total) by mouth daily   • [DISCONTINUED] amoxicillin (AMOXIL) 500 mg capsule TAKE ONE CAPSULE BY MOUTH THREE TIMES A DAY X 10 DAYS (Patient not taking: Reported on 12/1/2022)     No Known Allergies  Immunization History   Administered Date(s) Administered   • COVID-19 MODERNA VACC 0 5 ML IM 01/25/2021, 02/25/2021   • Tdap 08/06/2020   • influenza, injectable, quadrivalent 10/11/2020       Objective     /70 (BP Location: Right arm, Patient Position: Sitting, Cuff Size: Adult)   Pulse 94   Temp 98 1 °F (36 7 °C) (Tympanic)   Resp 16   Ht 5' 2 5\" (1 588 m)   Wt 57 6 kg (127 lb)   SpO2 99%   BMI 22 86 kg/m²     Physical Exam  Constitutional:       Appearance: Normal appearance  HENT:      Head: Normocephalic and atraumatic  Right Ear: Tympanic membrane normal  There is no impacted cerumen  Left Ear: Tympanic membrane normal  There is no impacted cerumen  Mouth/Throat:      Mouth: Mucous membranes are moist       Pharynx: Oropharynx is clear   No oropharyngeal " exudate or posterior oropharyngeal erythema  Cardiovascular:      Rate and Rhythm: Normal rate and regular rhythm  Heart sounds: Normal heart sounds  No murmur heard  No friction rub  No gallop  Pulmonary:      Effort: Pulmonary effort is normal       Breath sounds: Normal breath sounds  No wheezing, rhonchi or rales  Musculoskeletal:         General: Normal range of motion  Cervical back: Normal range of motion  Right lower leg: No edema  Left lower leg: No edema  Skin:     General: Skin is warm  Findings: No bruising, erythema or rash  Neurological:      General: No focal deficit present  Mental Status: She is alert  Mental status is at baseline     Psychiatric:         Mood and Affect: Mood normal          Behavior: Behavior normal        Dayana Bridges MD

## 2023-06-13 NOTE — ASSESSMENT & PLAN NOTE
Was discussed about immunizations, nutrition safety measures  She was given referral for colonoscopy

## 2023-06-13 NOTE — PROGRESS NOTES
Name: Renetta Chavez      : [id number]      MRN: [id number]  Encounter Provider: Vero Lutz MD  Encounter Date: 2023   Encounter department: [address] [place] [address]  Chronic idiopathic constipation  Assessment & Plan:  - patient experiencing constipation symptoms x 2 years  - has been taking metamucil, eating greens and drinking adequate water  - encouraged patient to continue with fiber intake and exercise  Recommended taking Miralax for symptom management  - referral to GI in place  Patient agreeable for colonoscopy  - will continue to monitor      2  Encounter for colorectal cancer screening  Assessment & Plan:  - referral to GI in place  - patient with chronic constipation symptoms  - educated on colonoscopy and cologuard  - will continue to monitor    Orders:  -     Ambulatory Referral to Gastroenterology; Future    3  Mixed hyperlipidemia  Assessment & Plan:  - labs from 2022 within normal limits  - advised patient to obtain routine blood work  - continue with crestor 5mg daily  - encouraged to continue with diet and exercise  - will continue to monitor    Orders:  -     CBC and differential; Future  -     Comprehensive metabolic panel; Future  -     Lipid Panel with Direct LDL reflex; Future  -     TSH, 3rd generation with Free T4 reflex; Future  -     rosuvastatin (CRESTOR) 5 mg tablet; Take 1 tablet (5 mg total) by mouth daily    4  Essential hypertension  Assessment & Plan:  - pressures stable today at 132/70  - encouraged to continue with diet and exercise  - continue with amlodipine and losartan  - patient has follow up with cardiology scheduled for August  - will continue to monitor    Orders:  -     CBC and differential; Future  -     Comprehensive metabolic panel; Future  -     Lipid Panel with Direct LDL reflex; Future  -     TSH, 3rd generation with Free T4 reflex; Future  -     amLODIPine (NORVASC) 2 5 mg tablet;  Take 1 tablet (2 5 mg total) by mouth daily    5  Vitamin D insufficiency  Assessment & Plan:  - patient with a history of vitamin D insufficiency  - plan for repeat labs  - will continue to monitor    Orders:  -     Vitamin D 25 hydroxy; Future    6  Healthcare maintenance  Assessment & Plan:  Was discussed about immunizations, nutrition safety measures  She was given referral for colonoscopy  Depression Screening and Follow-up Plan: Patient was screened for depression during today's encounter  They screened negative with a PHQ-2 score of 0  Subjective     ALONDRA is a 75year old female presenting with a history of HTN, HLD and melanoma presenting for routine follow up  She reports increased constipation symptoms that have been occurring over the past 2 years  She states she typically does not have a bowel movement for up to three days  On the third day, she will take a laxative for her symptoms with relief  She denies any black or bloody stools or severe abdominal pain  She reports taking Metamucil daily and drinking adequate amounts of water  Agreeable for referral to GI with colonoscopy  She states her wrist pain spontaneously improved and is no longer opting for surgical management  Labs reviewed with patient today  She denies any chest pain, shortness of breath, nausea or changes to her bladder function  She has a follow up appointment with dermatology next month, and follow up with cardiology in August     Hyperlipidemia  Pertinent negatives include no chest pain, myalgias or shortness of breath  Hypertension  Pertinent negatives include no chest pain, palpitations or shortness of breath  Review of Systems   Constitutional: Negative for chills and fever  HENT: Negative for ear pain and sore throat  Eyes: Negative for pain and visual disturbance  Respiratory: Negative for cough and shortness of breath  Cardiovascular: Negative for chest pain and palpitations  Gastrointestinal: Positive for constipation  "Negative for abdominal pain, blood in stool, diarrhea, nausea and vomiting  Genitourinary: Negative for dysuria and hematuria  Musculoskeletal: Negative for arthralgias, back pain and myalgias  Skin: Negative for color change and rash  Neurological: Negative for dizziness, syncope and light-headedness  All other systems reviewed and are negative        Past Medical History:   Diagnosis Date   • Arthritis    • Cancer [facility])    • Exercise involving walking     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[address], \"very active\" per pt   • History of basal cell cancer    • Hyperlipidemia    • Hypertension     Sees Dr Lucho Saldana [facility] cardio/Echo 1/17   • Osteoarthritis 06/05/2007   • Skin cancer 07/16/2015    right thigh   • Wears glasses      Past Surgical History:   Procedure Laterality Date   • FLAP LOCAL HEAD / NECK Bilateral 1/21/2022    Procedure: EXCISION OF LEFT EAR & RIGHT EYEBROW SKIN CANCER W/LOCAL FLAP;  Surgeon: Misty Dominguez MD;  [facility];  Service: Plastics   • MT FTH/GFT FREE W/DIRECT CLOSURE N/E/E/L 20 SQ CM/< Left 1/21/2022    Procedure: SKIN GRAFT;  Surgeon: Misty Dominguez MD;  [facility];  Service: Plastics   • SKIN BIOPSY      local to remove skin cancer   • TONSILLECTOMY     • WISDOM TOOTH EXTRACTION       Family History   Problem Relation Age of Onset   • Hypertension Mother    • Uterine cancer Mother    • Hypertension Father    • Hypertension Sister    • Ovarian cancer Sister         diagnosed in her 52's   • Hypertension Brother    • No Known Problems Maternal Grandmother    • No Known Problems Maternal Grandfather    • No Known Problems Paternal Grandmother    • No Known Problems Paternal Grandfather    • No Known Problems Son    • No Known Problems Son    • Breast cancer Neg Hx      Social History     Socioeconomic History   • Marital status: /Civil Union     Spouse name: None   • Number of children: None   • Years of education: None   • Highest education level: " "None   Occupational History   • None   Tobacco Use   • Smoking status: Former   • Smokeless tobacco: Never   • Tobacco comments:     \"I smoked in college, long ago  ; never a smoker & no passive smoke exposure as per [employer]   Vaping Use   • Vaping Use: Never used   Substance and Sexual Activity   • Alcohol use: Not Currently     Comment: \"rarely\"   • Drug use: No   • Sexual activity: Not Currently     Partners: Male     Birth control/protection: Post-menopausal   Other Topics Concern   • None   Social History Narrative   • None     Social Determinants of Health     Financial Resource Strain: Not on file   Food Insecurity: Not on file   Transportation Needs: Not on file   Physical Activity: Not on file   Stress: Not on file   Social Connections: Not on file   Intimate Partner Violence: Not on file   Housing Stability: Not on file     Current Outpatient Medications on File Prior to Visit   Medication Sig   • Co Q-10 50 MG Take by mouth daily   • [DISCONTINUED] amLODIPine (NORVASC) 2 5 mg tablet TAKE ONE TABLET BY MOUTH EVERY DAY   • [DISCONTINUED] losartan (COZAAR) 50 mg tablet Take 1 tablet (50 mg total) by mouth daily   • [DISCONTINUED] rosuvastatin (CRESTOR) 5 mg tablet Take 1 tablet (5 mg total) by mouth daily   • [DISCONTINUED] amoxicillin (AMOXIL) 500 mg capsule TAKE ONE CAPSULE BY MOUTH THREE TIMES A DAY X 10 DAYS (Patient not taking: Reported on 12/1/2022)     No Known Allergies  Immunization History   Administered Date(s) Administered   • COVID-19 MODERNA VACC 0 5 ML IM 01/25/2021, 02/25/2021   • Tdap 08/06/2020   • influenza, injectable, quadrivalent 10/11/2020       Objective     /70 (BP Location: Right arm, Patient Position: Sitting, Cuff Size: Adult)   Pulse 94   Temp 98 1 °F (36 7 °C) (Tympanic)   Resp 16   Ht 5' 2 5\" (1 588 m)   Wt 57 6 kg (127 lb)   SpO2 99%   BMI 22 86 kg/m²     Physical Exam  Constitutional:       Appearance: Normal appearance     HENT:      Head: Normocephalic and " atraumatic  Right Ear: Tympanic membrane normal  There is no impacted cerumen  Left Ear: Tympanic membrane normal  There is no impacted cerumen  Mouth/Throat:      Mouth: Mucous membranes are moist       Pharynx: Oropharynx is clear  No oropharyngeal exudate or posterior oropharyngeal erythema  Cardiovascular:      Rate and Rhythm: Normal rate and regular rhythm  Heart sounds: Normal heart sounds  No murmur heard  No friction rub  No gallop  Pulmonary:      Effort: Pulmonary effort is normal       Breath sounds: Normal breath sounds  No wheezing, rhonchi or rales  Musculoskeletal:         General: Normal range of motion  Cervical back: Normal range of motion  Right lower leg: No edema  Left lower leg: No edema  Skin:     General: Skin is warm  Findings: No bruising, erythema or rash  Neurological:      General: No focal deficit present  Mental Status: She is alert  Mental status is at baseline     Psychiatric:         Mood and Affect: Mood normal          Behavior: Behavior normal        Coretta Guevara MD

## 2023-06-19 ENCOUNTER — COSMETIC (OUTPATIENT)
Dept: PLASTIC SURGERY | Facility: CLINIC | Age: 68
End: 2023-06-19

## 2023-06-19 DIAGNOSIS — Z41.1 ENCOUNTER FOR COSMETIC PROCEDURE: Primary | ICD-10-CM

## 2023-06-19 PROCEDURE — RECHECK: Performed by: PLASTIC SURGERY

## 2023-06-19 NOTE — PROGRESS NOTES
Patient is a 74yr F who presents for treatment of facial aging, She is interested in non-surgical treatment  She is interested in treatment with Botox  She is an appropriate candidate due to presence of glabellar and forehead, Crow's feet rhytids  We discussed the risks, benefits, and alternatives of Botox including the nature of the treatment, its mechanism of action, the risk of bleeding, bruising, infection, scarring, asymmetry, poor aesthetic result, under correction, over-correction, brow ptosis, eyelid ptosis, need for repeat treatment, need for revision, need for touchup, I discussed with her that Botox typically takes approximately 5-7 days for desired affect and lasts approximately 3-4 months  All the patient's questions were answered to her satisfaction, informed consent obtained verbally  Post-procedure instructions given  Procedure: The target areas were cleansed with alcohol  14 units injected into the glabella in 5 injection sites- the injections were done in an aseptic conditions directly into the muscle belly  Additionally,  20 units were injected into the forehead area, keeping at least 2cm above the orbital rim,directly into the muscle  Next, the B/L Crow's feet were injected into orbicularis muscle at and lateral to the orbital rim in 4 injection sites, 16Units (8 each side)  The patient tolerated the procedure well  Ice applied  She can follow-up in 3-4 months or sooner p r n  for treatment

## 2023-08-11 PROBLEM — Z00.00 HEALTHCARE MAINTENANCE: Status: RESOLVED | Noted: 2022-04-05 | Resolved: 2023-08-11

## 2023-08-11 PROBLEM — Z12.11 ENCOUNTER FOR COLORECTAL CANCER SCREENING: Status: RESOLVED | Noted: 2023-06-12 | Resolved: 2023-08-11

## 2023-08-11 PROBLEM — Z12.12 ENCOUNTER FOR COLORECTAL CANCER SCREENING: Status: RESOLVED | Noted: 2023-06-12 | Resolved: 2023-08-11

## 2023-08-21 ENCOUNTER — HOSPITAL ENCOUNTER (OUTPATIENT)
Dept: MAMMOGRAPHY | Facility: MEDICAL CENTER | Age: 68
Discharge: HOME | End: 2023-08-21
Payer: COMMERCIAL

## 2023-08-21 VITALS — BODY MASS INDEX: 22.5 KG/M2 | HEIGHT: 63 IN | WEIGHT: 126.98 LBS

## 2023-08-21 DIAGNOSIS — Z12.31 VISIT FOR SCREENING MAMMOGRAM: ICD-10-CM

## 2023-08-21 PROCEDURE — 77063 BREAST TOMOSYNTHESIS BI: CPT

## 2023-08-21 PROCEDURE — 77067 SCR MAMMO BI INCL CAD: CPT

## 2023-08-23 ENCOUNTER — HOSPITAL ENCOUNTER (OUTPATIENT)
Dept: ULTRASOUND IMAGING | Facility: CLINIC | Age: 68
Discharge: HOME | End: 2023-08-23
Payer: COMMERCIAL

## 2023-08-23 DIAGNOSIS — R92.8 ABNORMAL SCREENING MAMMOGRAM: ICD-10-CM

## 2023-08-23 PROCEDURE — 76642 ULTRASOUND BREAST LIMITED: CPT

## 2023-08-24 ENCOUNTER — TELEPHONE (OUTPATIENT)
Dept: FAMILY MEDICINE CLINIC | Facility: CLINIC | Age: 68
End: 2023-08-24

## 2023-08-25 NOTE — TELEPHONE ENCOUNTER
As far as testing goes the only option is to wait to 6 months to repeat her diagnostic ultrasound.   In the meanwhile I would recommend referral to see breast surgeon Dr. Juli Buck for further eval.

## 2023-08-25 NOTE — TELEPHONE ENCOUNTER
I spoke with patient and informed her of Dr Dotty Medrano recommendations. Pt states the breast center had call her and discussed in detail her results. She is going to repeat testing in 6 mo and if any change request referral to Dr Marline Wolf at that time.

## 2024-02-14 DIAGNOSIS — I10 ESSENTIAL HYPERTENSION: Primary | ICD-10-CM

## 2024-02-14 NOTE — TELEPHONE ENCOUNTER
Pt doesn't recall ever needing a prior authorization for her bp med losartan. She called her pharmacy because she's out and they informed her that she needed to obtain one.     She said there has been no change to her ins, why does she need an auth now?    Could we please submit for auth approval asap? She only has  two pills left of her short supply and will need another likely if this is not done in time.     Please let her know what she needs to do with her refills in the future so she doesn't have this issue again.    Please call pt back with status of auth, thank you.

## 2024-02-15 RX ORDER — LOSARTAN POTASSIUM 50 MG/1
50 TABLET ORAL 2 TIMES DAILY
Qty: 30 TABLET | Refills: 0 | Status: CANCELLED | OUTPATIENT
Start: 2024-02-15

## 2024-02-15 NOTE — TELEPHONE ENCOUNTER
Pt was last seen 6/12/23 and she is aware a visit is needed. She stated she believes her cardiologist filled the medication

## 2024-02-21 PROBLEM — Z12.11 ENCOUNTER FOR SCREENING FOR MALIGNANT NEOPLASM OF COLON: Status: RESOLVED | Noted: 2019-12-17 | Resolved: 2024-02-21

## 2024-02-21 PROBLEM — J01.00 ACUTE MAXILLARY SINUSITIS: Status: RESOLVED | Noted: 2020-04-13 | Resolved: 2024-02-21

## 2024-02-26 ENCOUNTER — HOSPITAL ENCOUNTER (OUTPATIENT)
Dept: ULTRASOUND IMAGING | Facility: CLINIC | Age: 69
Discharge: HOME | End: 2024-02-26
Payer: COMMERCIAL

## 2024-02-26 DIAGNOSIS — R92.8 ABNORMAL MAMMOGRAM: ICD-10-CM

## 2024-02-26 PROCEDURE — 76642 ULTRASOUND BREAST LIMITED: CPT

## 2024-03-05 ENCOUNTER — RA CDI HCC (OUTPATIENT)
Dept: OTHER | Facility: HOSPITAL | Age: 69
End: 2024-03-05

## 2024-03-05 NOTE — PROGRESS NOTES
HCC coding opportunities       Chart reviewed, no opportunity found: CHART REVIEWED, NO OPPORTUNITY FOUND   This is a reminder to address (resolve/update/assess) ALL HCC (risk adjustment) codes as found on active problem list for 2024 as patient scores reset to zero KYLIE.  Also, just a reminder to please review and assess all other chronic conditions for 2024     Patients Insurance        Commercial Insurance: [employer]

## 2024-03-13 ENCOUNTER — OFFICE VISIT (OUTPATIENT)
Dept: FAMILY MEDICINE CLINIC | Facility: CLINIC | Age: 69
End: 2024-03-13
Payer: COMMERCIAL

## 2024-03-13 VITALS
TEMPERATURE: 98.3 F | WEIGHT: 125 LBS | DIASTOLIC BLOOD PRESSURE: 88 MMHG | SYSTOLIC BLOOD PRESSURE: 128 MMHG | RESPIRATION RATE: 16 BRPM | HEART RATE: 95 BPM | OXYGEN SATURATION: 98 % | BODY MASS INDEX: 22.15 KG/M2 | HEIGHT: 63 IN

## 2024-03-13 DIAGNOSIS — Z12.12 ENCOUNTER FOR COLORECTAL CANCER SCREENING: ICD-10-CM

## 2024-03-13 DIAGNOSIS — Z82.49 FAMILY HISTORY OF PREMATURE CORONARY ARTERY DISEASE: ICD-10-CM

## 2024-03-13 DIAGNOSIS — I10 ESSENTIAL HYPERTENSION: ICD-10-CM

## 2024-03-13 DIAGNOSIS — I10 PRIMARY HYPERTENSION: ICD-10-CM

## 2024-03-13 DIAGNOSIS — Z12.11 ENCOUNTER FOR COLORECTAL CANCER SCREENING: ICD-10-CM

## 2024-03-13 DIAGNOSIS — E78.2 MIXED HYPERLIPIDEMIA: Primary | ICD-10-CM

## 2024-03-13 PROBLEM — S89.91XA INJURY OF RIGHT KNEE: Status: RESOLVED | Noted: 2020-08-06 | Resolved: 2024-03-13

## 2024-03-13 PROBLEM — E55.9 VITAMIN D INSUFFICIENCY: Status: RESOLVED | Noted: 2018-06-28 | Resolved: 2024-03-13

## 2024-03-13 PROBLEM — M25.561 ACUTE PAIN OF RIGHT KNEE: Status: RESOLVED | Noted: 2020-08-06 | Resolved: 2024-03-13

## 2024-03-13 PROBLEM — M65.4 DE QUERVAIN'S TENOSYNOVITIS, LEFT: Status: RESOLVED | Noted: 2022-04-05 | Resolved: 2024-03-13

## 2024-03-13 PROCEDURE — 99214 OFFICE O/P EST MOD 30 MIN: CPT | Performed by: FAMILY MEDICINE

## 2024-03-13 RX ORDER — LOSARTAN POTASSIUM 50 MG/1
50 TABLET ORAL 2 TIMES DAILY
Qty: 180 TABLET | Refills: 1 | Status: SHIPPED | OUTPATIENT
Start: 2024-03-13

## 2024-03-13 RX ORDER — ROSUVASTATIN CALCIUM 10 MG/1
10 TABLET, COATED ORAL DAILY
Qty: 90 TABLET | Refills: 1 | Status: SHIPPED | OUTPATIENT
Start: 2024-03-13

## 2024-03-13 RX ORDER — AMLODIPINE BESYLATE 2.5 MG/1
2.5 TABLET ORAL DAILY
Qty: 90 TABLET | Refills: 1 | Status: SHIPPED | OUTPATIENT
Start: 2024-03-13

## 2024-03-13 NOTE — ASSESSMENT & PLAN NOTE
Stable.  Asymptomatic.  Continue on Crestor and continue with good blood pressure control.  Continue follow-up with cardiologist.

## 2024-03-13 NOTE — PROGRESS NOTES
Name: Yadira Lozano      : 1955      MRN: [id number]  Encounter Provider: Ryland Reddy MD  Encounter Date: 3/13/2024   Encounter department: [facility]    Assessment & Plan     1. Mixed hyperlipidemia  Assessment & Plan:  LDL slightly elevated.  Discussed about low-fat diet and regular exercise.  Continue on the Crestor 10 mg daily.  Continue to monitor her fasting lipid profile.    Orders:  -     rosuvastatin (CRESTOR) 10 MG tablet; Take 1 tablet (10 mg total) by mouth daily  -     CBC and differential; Future  -     Comprehensive metabolic panel; Future  -     Lipid Panel with Direct LDL reflex; Future  -     TSH, 3rd generation with Free T4 reflex; Future    2. Primary hypertension  Assessment & Plan:  Blood pressure is well-controlled.  Continue on amlodipine and losartan.  We will continue to monitor.  Continue to follow-up with cardiologist.      3. Essential hypertension  -     amLODIPine (NORVASC) 2.5 mg tablet; Take 1 tablet (2.5 mg total) by mouth daily  -     losartan (COZAAR) 50 mg tablet; Take 1 tablet (50 mg total) by mouth 2 (two) times a day  -     CBC and differential; Future  -     Comprehensive metabolic panel; Future  -     Lipid Panel with Direct LDL reflex; Future  -     TSH, 3rd generation with Free T4 reflex; Future    4. Encounter for colorectal cancer screening  -     Ambulatory referral to Gastroenterology; Future    5. Family history of premature coronary artery disease  Assessment & Plan:  Stable.  Asymptomatic.  Continue on Crestor and continue with good blood pressure control.  Continue follow-up with cardiologist.             Subjective     She is here today for follow-up multiple medical problems.  She has been taking her medications.  She denies any side effects from her medications.  Her blood work came back showing slightly elevated LDL.  That was done in January.  She denies any complaint today.      Review of Systems   Constitutional:   "Negative for chills and fever.   HENT:  Negative for trouble swallowing.    Eyes:  Negative for visual disturbance.   Respiratory:  Negative for cough and shortness of breath.    Cardiovascular:  Negative for chest pain, palpitations and leg swelling.   Gastrointestinal:  Negative for abdominal pain, constipation and diarrhea.   Endocrine: Negative for cold intolerance and heat intolerance.   Genitourinary:  Negative for difficulty urinating and dysuria.   Musculoskeletal:  Negative for gait problem.   Skin:  Negative for rash.   Neurological:  Negative for dizziness, tremors, seizures and headaches.   Hematological:  Negative for adenopathy.   Psychiatric/Behavioral:  Negative for behavioral problems.        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, \"very active\" per pt   • History of basal cell cancer    • Hyperlipidemia    • Hypertension     Sees Dr Ordaz [facility] cardio/Echo 1/17   • Osteoarthritis 06/05/2007   • Skin cancer 07/16/2015    right thigh   • Wears glasses      Past Surgical History:   Procedure Laterality Date   • FLAP LOCAL HEAD / NECK Bilateral 1/21/2022    Procedure: EXCISION OF LEFT EAR & RIGHT EYEBROW SKIN CANCER W/LOCAL FLAP;  Surgeon: Ame Patel MD;  Location: [facility];  Service: Plastics   • [place] FTH/GFT FREE W/DIRECT CLOSURE N/E/E/L 20 SQ CM/< Left 1/21/2022    Procedure: SKIN GRAFT;  Surgeon: Ame Patel MD;  Location: [facility];  Service: Plastics   • SKIN BIOPSY      local to remove skin cancer   • TONSILLECTOMY     • WISDOM TOOTH EXTRACTION       Family History   Problem Relation Age of Onset   • Hypertension Mother    • Uterine cancer Mother    • Hypertension Father    • Hypertension Sister    • Ovarian cancer Sister         diagnosed in her 50's   • Hypertension Brother    • No Known Problems Maternal Grandmother    • No Known " "Problems Maternal Grandfather    • No Known Problems Paternal Grandmother    • No Known Problems Paternal Grandfather    • No Known Problems Son    • No Known Problems Son    • Breast cancer Neg Hx      Social History     Socioeconomic History   • Marital status: /Civil Union     Spouse name: None   • Number of children: None   • Years of education: None   • Highest education level: None   Occupational History   • None   Tobacco Use   • Smoking status: Former   • Smokeless tobacco: Never   • Tobacco comments:     \"I smoked in college, long ago. ; never a smoker & no passive smoke exposure as per [employer]   Vaping Use   • Vaping status: Never Used   Substance and Sexual Activity   • Alcohol use: Not Currently     Comment: \"rarely\"   • Drug use: No   • Sexual activity: Not Currently     Partners: Male     Birth control/protection: Post-menopausal   Other Topics Concern   • None   Social History Narrative   • None     Social Determinants of Health     Financial Resource Strain: Not on file   Food Insecurity: Not on file   Transportation Needs: Not on file   Physical Activity: Not on file   Stress: Not on file   Social Connections: Not on file   Intimate Partner Violence: Not on file   Housing Stability: Not on file     Current Outpatient Medications on File Prior to Visit   Medication Sig   • Co Q-10 50 MG Take by mouth daily   • [DISCONTINUED] amLODIPine (NORVASC) 2.5 mg tablet Take 1 tablet (2.5 mg total) by mouth daily   • [DISCONTINUED] losartan (COZAAR) 50 mg tablet Take 50 mg by mouth 2 (two) times a day   • [DISCONTINUED] rosuvastatin (CRESTOR) 5 mg tablet Take 1 tablet (5 mg total) by mouth daily     No Known Allergies  Immunization History   Administered Date(s) Administered   • COVID-19 MODERNA VACC 0.5 ML IM 01/25/2021, 02/25/2021, 05/23/2022   • Tdap 08/06/2020   • influenza, injectable, quadrivalent 10/11/2020       Objective     /88 (BP Location: Left arm, Patient Position: Sitting, Cuff Size: " "Adult)   Pulse 95   Temp 98.3 °F (36.8 °C) (Tympanic)   Resp 16   Ht 5' 2.5\" (1.588 m)   Wt 56.7 kg (125 lb)   SpO2 98%   BMI 22.50 kg/m²     Physical Exam  Vitals and nursing note reviewed.   Constitutional:       Appearance: She is well-developed.   HENT:      Head: Normocephalic and atraumatic.   Eyes:      Pupils: Pupils are equal, round, and reactive to light.   Cardiovascular:      Rate and Rhythm: Normal rate and regular rhythm.      Heart sounds: Normal heart sounds.   Pulmonary:      Effort: Pulmonary effort is normal.      Breath sounds: Normal breath sounds.   Abdominal:      General: Bowel sounds are normal.      Palpations: Abdomen is soft.   Musculoskeletal:      Cervical back: Normal range of motion and neck supple.   Lymphadenopathy:      Cervical: No cervical adenopathy.   Skin:     General: Skin is warm.   Neurological:      Mental Status: She is alert and oriented to person, place, and time.       Ryland Reddy MD    "

## 2024-03-13 NOTE — ASSESSMENT & PLAN NOTE
Blood pressure is well-controlled.  Continue on amlodipine and losartan.  We will continue to monitor.  Continue to follow-up with cardiologist.

## 2024-03-13 NOTE — ASSESSMENT & PLAN NOTE
LDL slightly elevated.  Discussed about low-fat diet and regular exercise.  Continue on the Crestor 10 mg daily.  Continue to monitor her fasting lipid profile.

## 2024-06-18 ENCOUNTER — VBI (OUTPATIENT)
Dept: ADMINISTRATIVE | Facility: OTHER | Age: 69
End: 2024-06-18

## 2024-06-18 NOTE — TELEPHONE ENCOUNTER
06/18/24 8:02 AM     Chart reviewed for CRC: Colonoscopy ; nothing is submitted to the patient's insurance at this time.     SHARA MEJIA [facility]

## 2024-07-24 ENCOUNTER — TELEPHONE (OUTPATIENT)
Age: 69
End: 2024-07-24

## 2024-07-24 NOTE — TELEPHONE ENCOUNTER
Patient called to request orders for a mammo and diagnostic US.  Patient was advised these orders are already in the system and provided phone number [phone] for scheduling.

## 2024-08-16 ENCOUNTER — APPOINTMENT (OUTPATIENT)
Dept: LAB | Facility: IMAGING CENTER | Age: 69
End: 2024-08-16
Payer: COMMERCIAL

## 2024-08-16 ENCOUNTER — TELEPHONE (OUTPATIENT)
Age: 69
End: 2024-08-16

## 2024-08-16 DIAGNOSIS — E78.2 MIXED HYPERLIPIDEMIA: ICD-10-CM

## 2024-08-16 DIAGNOSIS — I10 ESSENTIAL HYPERTENSION: ICD-10-CM

## 2024-08-16 LAB
ALBUMIN SERPL BCG-MCNC: 4.2 G/DL (ref 3.5–5)
ALP SERPL-CCNC: 73 U/L (ref 34–104)
ALT SERPL W P-5'-P-CCNC: 19 U/L (ref 7–52)
ANION GAP SERPL CALCULATED.3IONS-SCNC: 7 MMOL/L (ref 4–13)
AST SERPL W P-5'-P-CCNC: 24 U/L (ref 13–39)
BASOPHILS # BLD AUTO: 0.04 THOUSANDS/ÂΜL (ref 0–0.1)
BASOPHILS NFR BLD AUTO: 1 % (ref 0–1)
BILIRUB SERPL-MCNC: 0.67 MG/DL (ref 0.2–1)
BUN SERPL-MCNC: 17 MG/DL (ref 5–25)
CALCIUM SERPL-MCNC: 9.6 MG/DL (ref 8.4–10.2)
CHLORIDE SERPL-SCNC: 103 MMOL/L (ref 96–108)
CHOLEST SERPL-MCNC: 207 MG/DL
CO2 SERPL-SCNC: 31 MMOL/L (ref 21–32)
CREAT SERPL-MCNC: 0.6 MG/DL (ref 0.6–1.3)
EOSINOPHIL # BLD AUTO: 0.27 THOUSAND/ÂΜL (ref 0–0.61)
EOSINOPHIL NFR BLD AUTO: 6 % (ref 0–6)
ERYTHROCYTE [DISTWIDTH] IN BLOOD BY AUTOMATED COUNT: 12.2 % (ref 11.6–15.1)
GFR SERPL CREATININE-BSD FRML MDRD: 92 ML/MIN/1.73SQ M
GLUCOSE P FAST SERPL-MCNC: 92 MG/DL (ref 65–99)
HCT VFR BLD AUTO: 43.2 % (ref 34.8–46.1)
HDLC SERPL-MCNC: 96 MG/DL
HGB BLD-MCNC: 14 G/DL (ref 11.5–15.4)
IMM GRANULOCYTES # BLD AUTO: 0.02 THOUSAND/UL (ref 0–0.2)
IMM GRANULOCYTES NFR BLD AUTO: 1 % (ref 0–2)
LDLC SERPL CALC-MCNC: 97 MG/DL (ref 0–100)
LYMPHOCYTES # BLD AUTO: 1.86 THOUSANDS/ÂΜL (ref 0.6–4.47)
LYMPHOCYTES NFR BLD AUTO: 42 % (ref 14–44)
MCH RBC QN AUTO: 29.8 PG (ref 26.8–34.3)
MCHC RBC AUTO-ENTMCNC: 32.4 G/DL (ref 31.4–37.4)
MCV RBC AUTO: 92 FL (ref 82–98)
MONOCYTES # BLD AUTO: 0.37 THOUSAND/ÂΜL (ref 0.17–1.22)
MONOCYTES NFR BLD AUTO: 9 % (ref 4–12)
NEUTROPHILS # BLD AUTO: 1.76 THOUSANDS/ÂΜL (ref 1.85–7.62)
NEUTS SEG NFR BLD AUTO: 41 % (ref 43–75)
NRBC BLD AUTO-RTO: 0 /100 WBCS
PLATELET # BLD AUTO: 364 THOUSANDS/UL (ref 149–390)
PMV BLD AUTO: 9.6 FL (ref 8.9–12.7)
POTASSIUM SERPL-SCNC: 4.1 MMOL/L (ref 3.5–5.3)
PROT SERPL-MCNC: 7 G/DL (ref 6.4–8.4)
RBC # BLD AUTO: 4.7 MILLION/UL (ref 3.81–5.12)
SODIUM SERPL-SCNC: 141 MMOL/L (ref 135–147)
TRIGL SERPL-MCNC: 70 MG/DL
TSH SERPL DL<=0.05 MIU/L-ACNC: 1.46 UIU/ML (ref 0.45–4.5)
WBC # BLD AUTO: 4.32 THOUSAND/UL (ref 4.31–10.16)

## 2024-08-16 PROCEDURE — 36415 COLL VENOUS BLD VENIPUNCTURE: CPT

## 2024-08-16 PROCEDURE — 85025 COMPLETE CBC W/AUTO DIFF WBC: CPT

## 2024-08-16 PROCEDURE — 80061 LIPID PANEL: CPT

## 2024-08-16 PROCEDURE — 80053 COMPREHEN METABOLIC PANEL: CPT

## 2024-08-16 PROCEDURE — 84443 ASSAY THYROID STIM HORMONE: CPT

## 2024-08-16 NOTE — TELEPHONE ENCOUNTER
Patient called in and stated that she went labs done today 8/16/24 and the Lipid Panel was never done or ordered. Advised patient that the Lipid Panel pending/active. Patient looked on her paper work and saw that it was part of her lab that were done today.

## 2024-08-24 ENCOUNTER — OFFICE VISIT (OUTPATIENT)
Dept: URGENT CARE | Age: 69
End: 2024-08-24
Payer: COMMERCIAL

## 2024-08-24 VITALS
HEART RATE: 76 BPM | DIASTOLIC BLOOD PRESSURE: 85 MMHG | RESPIRATION RATE: 18 BRPM | SYSTOLIC BLOOD PRESSURE: 182 MMHG | TEMPERATURE: 98.2 F | OXYGEN SATURATION: 99 %

## 2024-08-24 DIAGNOSIS — S90.122A: Primary | ICD-10-CM

## 2024-08-24 PROCEDURE — G0382 LEV 3 HOSP TYPE B ED VISIT: HCPCS | Performed by: EMERGENCY MEDICINE

## 2024-08-24 PROCEDURE — S9083 URGENT CARE CENTER GLOBAL: HCPCS | Performed by: EMERGENCY MEDICINE

## 2024-08-24 RX ORDER — CLINDAMYCIN HCL 300 MG
300 CAPSULE ORAL 3 TIMES DAILY
Qty: 21 CAPSULE | Refills: 0 | Status: SHIPPED | OUTPATIENT
Start: 2024-08-24 | End: 2024-08-31

## 2024-08-24 NOTE — PROGRESS NOTES
[facility]        NAME: Yadira Lozano is a 69 y.o. female  : 1955    MRN: [id number]  DATE: 2024  TIME: 1:25 PM    Assessment and Plan   Contusion of middle toe, left, initial encounter [S90.122A]  1. Contusion of middle toe, left, initial encounter  clindamycin (CLEOCIN) 300 MG capsule        Presents for eval of left middle toenail swelling. Stubbed nail twice in past week- nail bent back- developed swelling and some discharge from nailbed- slight redness noted. Discussed abx and warm soaks. Declined x-ray    Patient Instructions       Follow up with PCP in 3-5 days.  Proceed to  ER if symptoms worsen.    If tests have been performed at [facility], our office will contact you with results if changes need to be made to the care plan discussed with you at the visit.  You can review your full results on [facility].    Chief Complaint     Chief Complaint   Patient presents with    Toe Pain     Patient reports stubbed toe on shower ledge, bent toenail back X 5 days ago. Pain continues, is worried about infection. Patient has been treating toe with neosporin.         History of Present Illness       Presents for eval of left middle toenail swelling. Stubbed nail twice in past week- nail bent back- developed swelling and some discharge from nailbed- slight redness noted. Discussed abx and warm soaks. Declined x-ray    Toe Pain         Review of Systems   Review of Systems   Musculoskeletal:  Positive for arthralgias and joint swelling.   Skin:  Positive for color change and wound.   All other systems reviewed and are negative.        Current Medications       Current Outpatient Medications:     amLODIPine (NORVASC) 2.5 mg tablet, Take 1 tablet (2.5 mg total) by mouth daily, Disp: 90 tablet, Rfl: 1    clindamycin (CLEOCIN) 300 MG capsule, Take 1 capsule (300 mg total) by mouth 3 (three) times a day for 7 days, Disp: 21 capsule, Rfl: 0    Co Q-10 50 MG, Take by mouth daily, Disp: , Rfl:     " losartan (COZAAR) 50 mg tablet, Take 1 tablet (50 mg total) by mouth 2 (two) times a day, Disp: 180 tablet, Rfl: 1    rosuvastatin (CRESTOR) 10 MG tablet, Take 1 tablet (10 mg total) by mouth daily, Disp: 90 tablet, Rfl: 1    Current Allergies     Allergies as of 08/24/2024    (No Known Allergies)            The following portions of the patient's history were reviewed and updated as appropriate: allergies, current medications, past family history, past medical history, past social history, past surgical history and problem list.     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, \"very active\" per pt    History of basal cell cancer     Hyperlipidemia     Hypertension     Sees Dr Ordaz [facility] cardio/Echo 1/17    Osteoarthritis 06/05/2007    Skin cancer 07/16/2015    right thigh    Wears glasses        Past Surgical History:   Procedure Laterality Date    FLAP LOCAL HEAD / NECK Bilateral 1/21/2022    Procedure: EXCISION OF LEFT EAR & RIGHT EYEBROW SKIN CANCER W/LOCAL FLAP;  Surgeon: Ame Patel MD;  [facility];  Service: Plastics    [place] FTH/GFT FREE W/DIRECT CLOSURE N/E/E/L 20 SQ CM/< Left 1/21/2022    Procedure: SKIN GRAFT;  Surgeon: Ame Patel MD;  [facility];  Service: Plastics    SKIN BIOPSY      local to remove skin cancer    TONSILLECTOMY      WISDOM TOOTH EXTRACTION         Family History   Problem Relation Age of Onset    Hypertension Mother     Uterine cancer Mother     Hypertension Father     Hypertension Sister     Ovarian cancer Sister         diagnosed in her 50's    Hypertension Brother     No Known Problems Maternal Grandmother     No Known Problems Maternal Grandfather     No Known Problems Paternal Grandmother     No Known Problems Paternal Grandfather     No Known Problems Son     No Known Problems Son     Breast cancer Neg Hx  "         Medications have been verified.        Objective   BP (!) 182/85   Pulse 76   Temp 98.2 °F (36.8 °C)   Resp 18   SpO2 99%   No LMP recorded. Patient is postmenopausal.       Physical Exam     Physical Exam  Vitals reviewed.   Musculoskeletal:         General: Swelling, tenderness and signs of injury present.        Feet:    Feet:      Left foot:      Skin integrity: Skin breakdown and erythema present.

## 2024-11-05 ENCOUNTER — RA CDI HCC (OUTPATIENT)
Dept: OTHER | Facility: HOSPITAL | Age: 69
End: 2024-11-05

## 2024-11-07 ENCOUNTER — VBI (OUTPATIENT)
Dept: ADMINISTRATIVE | Facility: OTHER | Age: 69
End: 2024-11-07

## 2024-11-08 NOTE — TELEPHONE ENCOUNTER
11/07/24 7:28 PM     Chart reviewed for   Controlling High Blood Pressure    ; nothing is submitted to the patient's insurance at this time.     SHARA EMJIA MA   [facility]

## 2024-11-11 ENCOUNTER — OFFICE VISIT (OUTPATIENT)
Dept: FAMILY MEDICINE CLINIC | Facility: CLINIC | Age: 69
End: 2024-11-11
Payer: COMMERCIAL

## 2024-11-11 VITALS
SYSTOLIC BLOOD PRESSURE: 134 MMHG | TEMPERATURE: 98.4 F | BODY MASS INDEX: 22.29 KG/M2 | OXYGEN SATURATION: 97 % | WEIGHT: 125.8 LBS | RESPIRATION RATE: 16 BRPM | HEART RATE: 94 BPM | HEIGHT: 63 IN | DIASTOLIC BLOOD PRESSURE: 88 MMHG

## 2024-11-11 DIAGNOSIS — I10 PRIMARY HYPERTENSION: Primary | ICD-10-CM

## 2024-11-11 DIAGNOSIS — Z78.0 ASYMPTOMATIC MENOPAUSE: ICD-10-CM

## 2024-11-11 DIAGNOSIS — Z00.00 HEALTHCARE MAINTENANCE: ICD-10-CM

## 2024-11-11 DIAGNOSIS — E78.2 MIXED HYPERLIPIDEMIA: ICD-10-CM

## 2024-11-11 PROCEDURE — 99214 OFFICE O/P EST MOD 30 MIN: CPT | Performed by: FAMILY MEDICINE

## 2024-11-11 PROCEDURE — 99397 PER PM REEVAL EST PAT 65+ YR: CPT | Performed by: FAMILY MEDICINE

## 2024-11-11 NOTE — PROGRESS NOTES
Ambulatory Visit  Name: Yadira Lozano      : 1955      MRN: [id number]  Encounter Provider: Ryland Reddy MD  Encounter Date: 2024   [facility]    Assessment & Plan  Primary hypertension  Blood pressure is well-controlled.  Continue on amlodipine and losartan.  We will continue to monitor.  Continue to follow-up with cardiologist.    Orders:    CBC and differential; Future    Comprehensive metabolic panel; Future    Lipid Panel with Direct LDL reflex; Future    TSH, 3rd generation with Free T4 reflex; Future    Mixed hyperlipidemia  Cholesterol is well-controlled.  Discussed about low-fat diet and regular exercise.  Continue on the Crestor 10 mg daily.  Continue to monitor her fasting lipid profile.    Orders:    CBC and differential; Future    Comprehensive metabolic panel; Future    Lipid Panel with Direct LDL reflex; Future    TSH, 3rd generation with Free T4 reflex; Future    Asymptomatic menopause    Orders:    DXA bone density spine hip and pelvis; Future    Healthcare maintenance              History of Present Illness     Hypertension  This is a recurrent problem. The current episode started more than 1 year ago. The problem has been gradually improving since onset. The problem is controlled. Pertinent negatives include no anxiety, blurred vision, chest pain, headaches, malaise/fatigue, neck pain, orthopnea, palpitations, peripheral edema, PND, shortness of breath or sweats. Agents associated with hypertension include NSAIDs. Risk factors for coronary artery disease include dyslipidemia, family history and stress. There are no compliance problems.      Review of Systems   Constitutional:  Negative for malaise/fatigue.   Eyes:  Negative for blurred vision.   Respiratory:  Negative for shortness of breath.    Cardiovascular:  Negative for chest pain, palpitations, orthopnea and PND.   Musculoskeletal:  Negative for neck pain.   Neurological:   "Negative for headaches.     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, \"very active\" per pt    History of basal cell cancer     Hyperlipidemia     Hypertension     Sees Dr Ordaz [facility] cardio/Echo 1/17    Osteoarthritis 06/05/2007    Skin cancer 07/16/2015    right thigh    Wears glasses      Past Surgical History:   Procedure Laterality Date    FLAP LOCAL HEAD / NECK Bilateral 1/21/2022    Procedure: EXCISION OF LEFT EAR & RIGHT EYEBROW SKIN CANCER W/LOCAL FLAP;  Surgeon: Ame Patel MD;  [facility];  Service: Plastics    [place] FTH/GFT FREE W/DIRECT CLOSURE N/E/E/L 20 SQ CM/< Left 1/21/2022    Procedure: SKIN GRAFT;  Surgeon: Ame Patel MD;  [facility];  Service: Plastics    SKIN BIOPSY      local to remove skin cancer    TONSILLECTOMY      WISDOM TOOTH EXTRACTION       Family History   Problem Relation Age of Onset    Hypertension Mother     Uterine cancer Mother     Hypertension Father     Hypertension Sister     Ovarian cancer Sister         diagnosed in her 50's    Hypertension Brother     No Known Problems Maternal Grandmother     No Known Problems Maternal Grandfather     No Known Problems Paternal Grandmother     No Known Problems Paternal Grandfather     No Known Problems Son     No Known Problems Son     Breast cancer Neg Hx      Social History     Tobacco Use    Smoking status: Former    Smokeless tobacco: Never    Tobacco comments:     \"I smoked in college, long ago. ; never a smoker & no passive smoke exposure as per [employer]   Vaping Use    Vaping status: Never Used   Substance and Sexual Activity    Alcohol use: Not Currently     Comment: \"rarely\"    Drug use: No    Sexual activity: Not Currently     Partners: Male     Birth control/protection: Post-menopausal     Current Outpatient Medications on File Prior to Visit   Medication Sig " "   amLODIPine (NORVASC) 2.5 mg tablet Take 1 tablet (2.5 mg total) by mouth daily    Co Q-10 50 MG Take by mouth daily    losartan (COZAAR) 50 mg tablet Take 1 tablet (50 mg total) by mouth 2 (two) times a day    rosuvastatin (CRESTOR) 10 MG tablet Take 1 tablet (10 mg total) by mouth daily     No Known Allergies  Immunization History   Administered Date(s) Administered    COVID-19 MODERNA VACC 0.5 ML IM 01/25/2021, 02/25/2021, 05/23/2022    Tdap 08/06/2020    influenza, injectable, quadrivalent 10/11/2020     Objective     /88 (BP Location: Left arm, Patient Position: Sitting, Cuff Size: Standard)   Pulse 94   Temp 98.4 °F (36.9 °C) (Tympanic)   Resp 16   Ht 5' 2.5\" (1.588 m)   Wt 57.1 kg (125 lb 12.8 oz)   SpO2 97%   BMI 22.64 kg/m²     Physical Exam  Vitals and nursing note reviewed.   Constitutional:       Appearance: She is well-developed.   HENT:      Head: Normocephalic and atraumatic.   Eyes:      Pupils: Pupils are equal, round, and reactive to light.   Cardiovascular:      Rate and Rhythm: Normal rate and regular rhythm.      Heart sounds: Normal heart sounds.   Pulmonary:      Effort: Pulmonary effort is normal.      Breath sounds: Normal breath sounds.   Abdominal:      General: Bowel sounds are normal.      Palpations: Abdomen is soft.   Musculoskeletal:      Cervical back: Normal range of motion and neck supple.   Lymphadenopathy:      Cervical: No cervical adenopathy.   Skin:     General: Skin is warm.   Neurological:      Mental Status: She is alert and oriented to person, place, and time.         "

## 2024-11-11 NOTE — ASSESSMENT & PLAN NOTE
Blood pressure is well-controlled.  Continue on amlodipine and losartan.  We will continue to monitor.  Continue to follow-up with cardiologist.    Orders:    CBC and differential; Future    Comprehensive metabolic panel; Future    Lipid Panel with Direct LDL reflex; Future    TSH, 3rd generation with Free T4 reflex; Future

## 2024-11-11 NOTE — ASSESSMENT & PLAN NOTE
Cholesterol is well-controlled.  Discussed about low-fat diet and regular exercise.  Continue on the Crestor 10 mg daily.  Continue to monitor her fasting lipid profile.    Orders:    CBC and differential; Future    Comprehensive metabolic panel; Future    Lipid Panel with Direct LDL reflex; Future    TSH, 3rd generation with Free T4 reflex; Future

## 2024-12-11 PROBLEM — Z00.00 HEALTHCARE MAINTENANCE: Status: RESOLVED | Noted: 2022-04-05 | Resolved: 2024-12-11

## 2025-04-05 DIAGNOSIS — I10 ESSENTIAL HYPERTENSION: ICD-10-CM

## 2025-04-06 RX ORDER — AMLODIPINE BESYLATE 2.5 MG/1
2.5 TABLET ORAL DAILY
Qty: 90 TABLET | Refills: 1 | Status: SHIPPED | OUTPATIENT
Start: 2025-04-06

## 2025-05-07 DIAGNOSIS — I10 ESSENTIAL HYPERTENSION: ICD-10-CM

## 2025-05-08 RX ORDER — LOSARTAN POTASSIUM 50 MG/1
50 TABLET ORAL 2 TIMES DAILY
Qty: 180 TABLET | Refills: 1 | Status: SHIPPED | OUTPATIENT
Start: 2025-05-08

## 2025-05-12 ENCOUNTER — OFFICE VISIT (OUTPATIENT)
Dept: FAMILY MEDICINE CLINIC | Facility: CLINIC | Age: 70
End: 2025-05-12
Payer: COMMERCIAL

## 2025-05-12 VITALS
TEMPERATURE: 97.6 F | DIASTOLIC BLOOD PRESSURE: 76 MMHG | WEIGHT: 106.4 LBS | HEIGHT: 63 IN | BODY MASS INDEX: 18.85 KG/M2 | HEART RATE: 82 BPM | RESPIRATION RATE: 16 BRPM | OXYGEN SATURATION: 99 % | SYSTOLIC BLOOD PRESSURE: 130 MMHG

## 2025-05-12 DIAGNOSIS — I10 PRIMARY HYPERTENSION: ICD-10-CM

## 2025-05-12 DIAGNOSIS — Z12.11 ENCOUNTER FOR COLORECTAL CANCER SCREENING: ICD-10-CM

## 2025-05-12 DIAGNOSIS — E78.2 MIXED HYPERLIPIDEMIA: Primary | ICD-10-CM

## 2025-05-12 DIAGNOSIS — Z78.0 ASYMPTOMATIC MENOPAUSE: ICD-10-CM

## 2025-05-12 DIAGNOSIS — Z12.12 ENCOUNTER FOR COLORECTAL CANCER SCREENING: ICD-10-CM

## 2025-05-12 DIAGNOSIS — Z12.31 ENCOUNTER FOR SCREENING MAMMOGRAM FOR BREAST CANCER: ICD-10-CM

## 2025-05-12 PROCEDURE — 99214 OFFICE O/P EST MOD 30 MIN: CPT | Performed by: FAMILY MEDICINE

## 2025-05-12 NOTE — ASSESSMENT & PLAN NOTE
- Well-controlled. Repeat /76.  - Continue losartan (Cozaar) 50 mg tab PO daily and amlodipine (Norvasc) 2.5 mg tab PO daily.   - Plan 6 month f/u.    Orders:  •  CBC and differential; Future  •  Comprehensive metabolic panel; Future  •  Lipid Panel with Direct LDL reflex; Future  •  TSH, 3rd generation with Free T4 reflex; Future

## 2025-05-12 NOTE — PROGRESS NOTES
Name: Yadira Lozano      : 1955      MRN: [id number]  Encounter Provider: Ryland Reddy MD  Encounter Date: 2025   Encounter department: [facility] [address] [facility]  :  Assessment & Plan  Mixed hyperlipidemia  - Obtain updated lipid panel. Will contact pt with results.   - Continue rosuvastatin (Crestor) 10 mg PO daily.  - Plan 6 month f/u with repeat labs.  Orders:  •  CBC and differential; Future  •  Comprehensive metabolic panel; Future  •  Lipid Panel with Direct LDL reflex; Future  •  TSH, 3rd generation with Free T4 reflex; Future    Primary hypertension  - Well-controlled. Repeat /76.  - Continue losartan (Cozaar) 50 mg tab PO daily and amlodipine (Norvasc) 2.5 mg tab PO daily.   - Plan 6 month f/u.    Orders:  •  CBC and differential; Future  •  Comprehensive metabolic panel; Future  •  Lipid Panel with Direct LDL reflex; Future  •  TSH, 3rd generation with Free T4 reflex; Future    Encounter for screening mammogram for breast cancer    Orders:  •  Mammo screening bilateral w 3d and cad; Future    Asymptomatic menopause  - Discussed importance of obtaining DXA scan.   Orders:  •  DXA bone density spine hip and pelvis; Future    Encounter for colorectal cancer screening    Orders:  •  Ambulatory referral to General Surgery; Future           History of Present Illness   Yadira Lozano is a 70 y/o female with a PMH of HTN, HLD and is presenting for follow up on multiple medical problems. She has been tolerating all medications well and denies any adverse side effects. Blood pressure in the office today was 148/84. No updated labs available for review.  Pt has lost 19 lbs since her last visit. The pt states that the weight loss was intentional. No additional complaints today.      Hyperlipidemia  Pertinent negatives include no chest pain, myalgias or shortness of breath.   Hypertension  Pertinent negatives include no chest pain, headaches, palpitations or shortness of breath.  "    Review of Systems   Constitutional:  Negative for chills, fatigue, fever and unexpected weight change.   HENT:  Negative for ear pain and sore throat.    Eyes:  Negative for pain and visual disturbance.   Respiratory:  Negative for cough and shortness of breath.    Cardiovascular:  Negative for chest pain and palpitations.   Gastrointestinal:  Negative for constipation, diarrhea, nausea and vomiting.   Genitourinary:  Negative for dysuria, frequency, hematuria and urgency.   Musculoskeletal:  Negative for arthralgias and myalgias.   Skin:  Negative for rash.   Neurological:  Negative for dizziness, light-headedness and headaches.   All other systems reviewed and are negative.      Objective   /76   Pulse 82   Temp 97.6 °F (36.4 °C) (Tympanic)   Resp 16   Ht 5' 2.5\" (1.588 m)   Wt 48.3 kg (106 lb 6.4 oz)   SpO2 99%   BMI 19.15 kg/m²      Physical Exam  Vitals reviewed.   Constitutional:       General: She is not in acute distress.     Appearance: She is well-developed.   HENT:      Head: Normocephalic and atraumatic.      Right Ear: Tympanic membrane, ear canal and external ear normal.      Left Ear: Tympanic membrane, ear canal and external ear normal.      Mouth/Throat:      Mouth: Mucous membranes are moist.      Pharynx: No oropharyngeal exudate or posterior oropharyngeal erythema.   Eyes:      Extraocular Movements: Extraocular movements intact.      Conjunctiva/sclera: Conjunctivae normal.   Cardiovascular:      Rate and Rhythm: Normal rate and regular rhythm.      Heart sounds: No murmur heard.  Pulmonary:      Effort: Pulmonary effort is normal. No respiratory distress.      Breath sounds: Normal breath sounds.   Musculoskeletal:         General: No swelling.      Cervical back: Normal range of motion.   Skin:     General: Skin is warm and dry.      Capillary Refill: Capillary refill takes less than 2 seconds.   Neurological:      Mental Status: She is alert.   Psychiatric:         Mood and " Affect: Mood normal.

## 2025-05-12 NOTE — ASSESSMENT & PLAN NOTE
- Obtain updated lipid panel. Will contact pt with results.   - Continue rosuvastatin (Crestor) 10 mg PO daily.  - Plan 6 month f/u with repeat labs.  Orders:  •  CBC and differential; Future  •  Comprehensive metabolic panel; Future  •  Lipid Panel with Direct LDL reflex; Future  •  TSH, 3rd generation with Free T4 reflex; Future

## 2025-05-21 ENCOUNTER — VBI (OUTPATIENT)
Dept: ADMINISTRATIVE | Facility: OTHER | Age: 70
End: 2025-05-21

## 2025-05-21 NOTE — TELEPHONE ENCOUNTER
05/21/25 12:12 PM     Chart reviewed for Mammogram ; nothing is submitted to the patient's insurance at this time.     Tenisha Olsen MA   [facility]

## 2025-07-03 ENCOUNTER — VBI (OUTPATIENT)
Dept: ADMINISTRATIVE | Facility: OTHER | Age: 70
End: 2025-07-03

## 2025-07-03 NOTE — TELEPHONE ENCOUNTER
07/03/25 10:00 AM     Chart reviewed for CRC: Colonoscopy ; nothing is submitted to the patient's insurance at this time.     Tenisha Olsen MA   [facility]

## 2025-08-20 ENCOUNTER — TELEPHONE (OUTPATIENT)
Dept: MAMMOGRAPHY | Facility: CLINIC | Age: 70
End: 2025-08-20

## 2025-08-22 ENCOUNTER — TELEPHONE (OUTPATIENT)
Dept: FAMILY MEDICINE CLINIC | Facility: CLINIC | Age: 70
End: 2025-08-22

## (undated) DEVICE — SCD SEQUENTIAL COMPRESSION COMFORT SLEEVE MEDIUM KNEE LENGTH: Brand: KENDALL SCD

## (undated) DEVICE — DRAPE EQUIPMENT RF WAND

## (undated) DEVICE — ADHESIVE SKIN HIGH VISCOSITY EXOFIN 1ML

## (undated) DEVICE — DRAPE SHEET THREE QUARTER

## (undated) DEVICE — SUT SILK 5-0 P-3 18 IN 640G

## (undated) DEVICE — SKIN MARKER DUAL TIP WITH RULER CAP, FLEXIBLE RULER AND LABELS: Brand: DEVON

## (undated) DEVICE — 10FR FRAZIER SUCTION HANDLE: Brand: CARDINAL HEALTH

## (undated) DEVICE — NEEDLE 25G X 1 1/2

## (undated) DEVICE — SPONGE STICK WITH PVP-I: Brand: KENDALL

## (undated) DEVICE — BETHLEHEM UNIVERSAL MINOR GEN: Brand: CARDINAL HEALTH

## (undated) DEVICE — GLOVE SRG BIOGEL 7.5

## (undated) DEVICE — 2000CC GUARDIAN II: Brand: GUARDIAN

## (undated) DEVICE — TUBING SUCTION 5MM X 12 FT

## (undated) DEVICE — TIBURON SPLIT SHEET: Brand: CONVERTORS

## (undated) DEVICE — INTENDED FOR TISSUE SEPARATION, AND OTHER PROCEDURES THAT REQUIRE A SHARP SURGICAL BLADE TO PUNCTURE OR CUT.: Brand: BARD-PARKER ® CARBON RIB-BACK BLADES

## (undated) DEVICE — PENCIL ELECTROSURG E-Z CLEAN -0035H

## (undated) DEVICE — GLOVE INDICATOR PI UNDERGLOVE SZ 7.5 BLUE

## (undated) DEVICE — COTTON BALLS: Brand: DEROYAL

## (undated) DEVICE — OCCLUSIVE GAUZE STRIP,3% BISMUTH TRIBROMOPHENATE IN PETROLATUM BLEND: Brand: XEROFORM